# Patient Record
Sex: FEMALE | Race: WHITE | NOT HISPANIC OR LATINO | Employment: FULL TIME | URBAN - METROPOLITAN AREA
[De-identification: names, ages, dates, MRNs, and addresses within clinical notes are randomized per-mention and may not be internally consistent; named-entity substitution may affect disease eponyms.]

---

## 2019-05-21 ENCOUNTER — HISTORICAL (OUTPATIENT)
Dept: BARIATRICS | Facility: HOSPITAL | Age: 59
End: 2019-05-21

## 2019-06-05 ENCOUNTER — HISTORICAL (OUTPATIENT)
Dept: ANESTHESIOLOGY | Facility: HOSPITAL | Age: 59
End: 2019-06-05

## 2019-06-25 ENCOUNTER — HISTORICAL (OUTPATIENT)
Dept: BARIATRICS | Facility: HOSPITAL | Age: 59
End: 2019-06-25

## 2019-07-16 ENCOUNTER — HISTORICAL (OUTPATIENT)
Dept: BARIATRICS | Facility: HOSPITAL | Age: 59
End: 2019-07-16

## 2019-07-24 ENCOUNTER — HISTORICAL (OUTPATIENT)
Dept: SURGERY | Facility: HOSPITAL | Age: 59
End: 2019-07-24

## 2019-07-26 LAB — CRC RECOMMENDATION EXT: NORMAL

## 2019-08-05 ENCOUNTER — HISTORICAL (OUTPATIENT)
Dept: PREADMISSION TESTING | Facility: HOSPITAL | Age: 59
End: 2019-08-05

## 2019-08-13 ENCOUNTER — HISTORICAL (OUTPATIENT)
Dept: BARIATRICS | Facility: HOSPITAL | Age: 59
End: 2019-08-13

## 2019-09-23 ENCOUNTER — HISTORICAL (OUTPATIENT)
Dept: ADMINISTRATIVE | Facility: HOSPITAL | Age: 59
End: 2019-09-23

## 2019-09-24 ENCOUNTER — HISTORICAL (OUTPATIENT)
Dept: BARIATRICS | Facility: HOSPITAL | Age: 59
End: 2019-09-24

## 2019-12-02 ENCOUNTER — HISTORICAL (OUTPATIENT)
Dept: BARIATRICS | Facility: HOSPITAL | Age: 59
End: 2019-12-02

## 2020-01-07 ENCOUNTER — HISTORICAL (OUTPATIENT)
Dept: ADMINISTRATIVE | Facility: HOSPITAL | Age: 60
End: 2020-01-07

## 2020-02-04 ENCOUNTER — HISTORICAL (OUTPATIENT)
Dept: BARIATRICS | Facility: HOSPITAL | Age: 60
End: 2020-02-04

## 2020-04-28 ENCOUNTER — HISTORICAL (OUTPATIENT)
Dept: BARIATRICS | Facility: HOSPITAL | Age: 60
End: 2020-04-28

## 2020-06-04 ENCOUNTER — HISTORICAL (OUTPATIENT)
Dept: HEMATOLOGY/ONCOLOGY | Facility: CLINIC | Age: 60
End: 2020-06-04

## 2020-07-22 ENCOUNTER — HISTORICAL (OUTPATIENT)
Dept: BARIATRICS | Facility: HOSPITAL | Age: 60
End: 2020-07-22

## 2020-07-22 ENCOUNTER — HISTORICAL (OUTPATIENT)
Dept: ADMINISTRATIVE | Facility: HOSPITAL | Age: 60
End: 2020-07-22

## 2020-08-03 ENCOUNTER — HISTORICAL (OUTPATIENT)
Dept: BARIATRICS | Facility: HOSPITAL | Age: 60
End: 2020-08-03

## 2021-06-09 ENCOUNTER — HISTORICAL (OUTPATIENT)
Dept: ADMINISTRATIVE | Facility: HOSPITAL | Age: 61
End: 2021-06-09

## 2021-09-14 ENCOUNTER — HISTORICAL (OUTPATIENT)
Dept: ADMINISTRATIVE | Facility: HOSPITAL | Age: 61
End: 2021-09-14

## 2021-09-14 LAB
ABS NEUT (OLG): 3.3 X10(3)/MCL (ref 2.1–9.2)
ALBUMIN SERPL-MCNC: 4 GM/DL (ref 3.4–5)
ALBUMIN/GLOB SERPL: 1.67 {RATIO} (ref 1.5–2.5)
ALP SERPL-CCNC: 76 UNIT/L (ref 38–126)
ALT SERPL-CCNC: 17 UNIT/L (ref 7–52)
AST SERPL-CCNC: 15 UNIT/L (ref 15–37)
BILIRUB SERPL-MCNC: 0.4 MG/DL (ref 0.2–1)
BILIRUBIN DIRECT+TOT PNL SERPL-MCNC: 0.1 MG/DL (ref 0–0.5)
BILIRUBIN DIRECT+TOT PNL SERPL-MCNC: 0.3 MG/DL
BUN SERPL-MCNC: 11 MG/DL (ref 7–18)
CALCIUM SERPL-MCNC: 9.2 MG/DL (ref 8.5–10)
CHLORIDE SERPL-SCNC: 101 MMOL/L (ref 98–107)
CHOLEST SERPL-MCNC: 201 MG/DL (ref 0–200)
CHOLEST/HDLC SERPL: 5.4 {RATIO}
CO2 SERPL-SCNC: 30 MMOL/L (ref 21–32)
CREAT SERPL-MCNC: 0.71 MG/DL (ref 0.6–1.3)
CREAT UR-MCNC: 200 MG/DL
DEPRECATED CALCIDIOL+CALCIFEROL SERPL-MC: 34.5 NG/ML (ref 30–80)
ERYTHROCYTE [DISTWIDTH] IN BLOOD BY AUTOMATED COUNT: 12.7 % (ref 11.5–17)
EST. AVERAGE GLUCOSE BLD GHB EST-MCNC: 214 MG/DL
GLOBULIN SER-MCNC: 2.4 GM/DL (ref 1.2–3)
GLUCOSE SERPL-MCNC: 225 MG/DL (ref 74–106)
HBA1C MFR BLD: 9.1 % (ref 4.4–6.4)
HCT VFR BLD AUTO: 39.7 % (ref 37–47)
HDLC SERPL-MCNC: 37 MG/DL (ref 35–60)
HGB BLD-MCNC: 13.4 GM/DL (ref 12–16)
LDLC SERPL CALC-MCNC: 130 MG/DL (ref 0–129)
LYMPHOCYTES # BLD AUTO: 2.5 X10(3)/MCL (ref 0.6–3.4)
LYMPHOCYTES NFR BLD AUTO: 38.8 % (ref 13–40)
MCH RBC QN AUTO: 31 PG (ref 27–31.2)
MCHC RBC AUTO-ENTMCNC: 34 GM/DL (ref 32–36)
MCV RBC AUTO: 92 FL (ref 80–94)
MICROALBUMIN UR-MCNC: 150 MG/L
MICROALBUMIN/CREAT RATIO PNL UR: ABNORMAL MG/GM
MONOCYTES # BLD AUTO: 0.6 X10(3)/MCL (ref 0.1–1.3)
MONOCYTES NFR BLD AUTO: 9.9 % (ref 0.1–24)
NEUTROPHILS NFR BLD AUTO: 51.3 % (ref 47–80)
PLATELET # BLD AUTO: 210 X10(3)/MCL (ref 130–400)
PMV BLD AUTO: 10.5 FL (ref 9.4–12.4)
POTASSIUM SERPL-SCNC: 4 MMOL/L (ref 3.5–5.1)
PROT SERPL-MCNC: 6.4 GM/DL (ref 6.4–8.2)
RBC # BLD AUTO: 4.32 X10(6)/MCL (ref 4.2–5.4)
SODIUM SERPL-SCNC: 141 MMOL/L (ref 136–145)
TRIGL SERPL-MCNC: 225 MG/DL (ref 30–150)
VLDLC SERPL CALC-MCNC: 45 MG/DL
WBC # SPEC AUTO: 6.4 X10(3)/MCL (ref 4.5–11.5)

## 2022-04-07 ENCOUNTER — HISTORICAL (OUTPATIENT)
Dept: ADMINISTRATIVE | Facility: HOSPITAL | Age: 62
End: 2022-04-07

## 2022-04-24 VITALS
WEIGHT: 175.25 LBS | SYSTOLIC BLOOD PRESSURE: 140 MMHG | BODY MASS INDEX: 29.92 KG/M2 | HEIGHT: 64 IN | DIASTOLIC BLOOD PRESSURE: 73 MMHG

## 2022-04-28 NOTE — OP NOTE
Patient:   Rosario Regalado             MRN: [id number]            FIN: [id number]               Age:   59 years     Sex:  Female     :  1960   Associated Diagnoses:   HEARTBURN   Author:   Keon Vieira MD      Operative Note   Operative Information   Date/ Time:  2019 07:36:00.     Procedures Performed: Procedure Code   Esophagogastroduodenoscopy on 2019 at 59 Years.  Comments:  2019 7:36 CDT - Ana Rosa MOON, Felicia MEZA  auto-populated from documented surgical case, Esophagogastroduodenoscopy.     Preoperative Diagnosis: HEARTBURN ([id number] R12).     Postoperative Diagnosis: HEARTBURN ([id number] R12).     Surgeon: Keon Vieira MD.     Anesthesia: MAC.     Description of Procedure/Findings/    Complications: Patient was taken to the OR.  The patient's throat was aneshtetized.  MAC was utilized for anesthesia.   was easily passed.  Findings were as follows:  -Oropharynx: normal  -Airway: normal  -Esophagus: normal  -Stomach: normal    -Duodenum: normal.     Esimated blood loss: No blood loss.     Complications: None.

## 2022-05-01 NOTE — HISTORICAL [facility] CERNER
This is a historical note converted from [facility]. Formatting and pictures may have been removed.  Please reference [facility] for original formatting and attached multimedia. Chief Complaint  ear infection  History of Present Illness  Patient with swelling on left side of face for a few days, Had trouble finding a pharmacy to fill her Augmentin 875mg Rx, was in [place], and finally got it filled on 6-4-21  Pain in left cheek and gums.  Review of Systems  General :?Headache,?no dizziness,?no syncope  Eyes :?no eye redness, drainage, or pain; ?no visual complaints  HENT :?See HPI.  Respiratory :?Moderate?cough, Sharp pain in lungs when breathing.  Cardiovascular : no chest pain, palpitations,?or dyspnea on exertion  Gastrointestinal :?no nausea, vomiting, or diarrhea. No abdominal pain. No hematochezia, melena, or constipation?????????????????????????????????????????????????????????  Musculoskeletal :?No joint swelling , No joint pain. No myalgia???????????????????????????  Skin :?no rash or swollen lymph nodes  Physical Exam  Vitals & Measurements  HR:?78(Peripheral)? BP:?130/72?  HT:?163.00?cm? HT:?163?cm? WT:?82.600?kg? WT:?82.6?kg? BMI:?31.09?  General : Moderate wet cough, alert and oriented, stable on room air  Ears :?Left EAC with erythema and exudate, TM intact.? Right TM and EAC within normal limits.  Nose :?Erythema, clear discharge, sinus tenderness  OP :?Erythema, no exudate  Neck : Supple, no LAD  Lungs : Occasional wheeze, occasional rhonchi, no crackles. ?Good air movement.  CV : RRR, no murmur  ABD : Benign  Back : ?No CVAT  ?   XR chest: No infiltrate or effusion  Assessment/Plan  1.?Sinusitis?J32.9  Antibiotic changed to Omnicef 300 mg twice daily for 10 days.  2.?OE (otitis externa)?H60.90  Prescribed Floxin eardrops which she will use for 7 days.  3.?Cough?R05  ?Will send x-ray?to radiologist for interpretation.  4.?Bronchospasm?J98.01  ?Prescribed albuterol HFA?to use as needed  Referrals  Clinic  Follow-up PRN, 06/09/21 15:59:00 CDT, [facility], Future Order   Problem List/Past Medical History  Ongoing  Anemia  Anxiety  Arthritis of right hand  CAD - Coronary artery disease  DM2 (diabetes mellitus, type 2)  Hemorrhagic condition, unspecified  HTN (hypertension)  Hypercholesteremia  Neuropathy  Obesity  Palpitations  Peripheral venous insufficiency  Sinusitis  Sleep apnea  Stented coronary artery  Wellness examination  Historical  Acute UTI  Syncope  Procedure/Surgical History  Transfusion of Nonautologous Red Blood Cells into Peripheral Vein, Percutaneous Approach (07/31/2019)  Excision of Stomach, Percutaneous Endoscopic Approach, Vertical (07/29/2019)  Gastric Sleeve Laparoscopic (.) (07/29/2019)  Colonoscopy (07/26/2019)  Esophagogastroduodenoscopy (07/24/2019)  Esophagogastroduodenoscopy, flexible, transoral; diagnostic, including collection of specimen(s) by brushing or washing, when performed (separate procedure) (07/24/2019)  Inspection of Upper Intestinal Tract, Via Natural or Artificial Opening Endoscopic (07/24/2019)  Bilateral tubal ligation  Esophagogastroduodenoscopy  heart cath  Insertion of coronary artery stent  Partial hysterectomy   Medications  Albuterol (Eqv-ProAir HFA) 90 mcg/inh inhalation aerosol, 2 puff(s), INH, q4hr, PRN, 1 refills  Allegra 24 Hour Allergy oral tablet, 180 mg= 1 tab(s), Oral, Daily  Astelin 137 mcg/inh nasal spray, 2 spray(s), Nasal, BID, 11 refills,? ?Not taking  buPROPion 300 mg/24 hours (XL) oral tablet, extended release, See Instructions  carvedilol 3.125 mg oral tablet, See Instructions  Centrum Adults  clopidogrel 75 mg oral tablet, See Instructions  ezetimibe 10 mg oral tablet, See Instructions  fenofibrate 145 mg oral tablet, See Instructions  Floxin 0.3% otic solution, 5 drop(s), Otic, BID, 1 refills  hydrochlorothiazide-losartan 25 mg-100 mg oral tablet, See Instructions  montelukast 10 mg oral TABLET, See Instructions  Omnicef 300 mg oral capsule, 300  mg= 1 cap(s), Oral, q12hr  rosuvastatin 20 mg oral tablet, See Instructions  Vaniqa 13.9% topical cream, 1 vishal, TOP, BID, 11 refills,? ?Not taking  Vitamin B12 1000 mcg oral tablet, 1000 mcg= 1 tab(s), Oral, Daily  Allergies  Contrast Dye?(rash)  Cymbalta?(SUCIDIAL THOUGHTS)  Glucophage?(vomiting)  Morphine Sulfate?(trouble breathing, rash)  Ultram?(throat closing)  Social History  Abuse/Neglect  No, 06/09/2021  No, 10/28/2020  No, 03/27/2020  No, 02/18/2020  No, 01/20/2020  No, 09/26/2019  No, 08/15/2019  No, 07/29/2019  No, 07/24/2019  No, 07/15/2019  Alcohol  1-2 times per month, 07/08/2019  Employment/School  Employed, 03/27/2020  Home/Environment  Lives with Alone., 03/27/2020  Tobacco  Former smoker, quit more than 30 days ago, No, 06/09/2021  Former smoker, quit more than 30 days ago, No, 10/28/2020  Former smoker, quit more than 30 days ago, No, 03/27/2020  Former smoker, quit more than 30 days ago, N/A, 02/18/2020  Former smoker, quit more than 30 days ago, N/A, 09/26/2019  Former smoker, quit more than 30 days ago, N/A, 08/15/2019  Former smoker, quit more than 30 days ago, Cigarettes, N/A, 07/29/2019  Never (less than 100 in lifetime), N/A, 07/29/2019  Never (less than 100 in lifetime), N/A, 07/24/2019  Former smoker, quit more than 30 days ago, N/A, 07/15/2019  Family History  Cardiac disorder: Grandfather.  DM - Diabetes mellitus: Aunt, Grandmother and Uncle.  Heart attack: Grandfather and Grandmother.  Hypertension: Mother, Aunt and Grandmother.  Stroke: Grandmother.  Health Maintenance  Health Maintenance  ???Pending?(in the next year)  ??? ??OverDue  ??? ? ? ?Breast Cancer Screening due??03/23/19??and every 2??year(s)  ??? ? ? ?Coronary Artery Disease Maintenance-Electrocardiogram due??07/29/20??and every 1??year(s)  ??? ? ? ?Diabetes Maintenance-Medication Prescribed due??07/31/20??and every 1??year(s)  ??? ? ? ?Influenza Vaccine due??10/01/20??and every 1??day(s)  ??? ? ? ?Alcohol Misuse Screening  due??01/02/21??and every 1??year(s)  ??? ? ? ?Diabetes Maintenance-Fasting Lipid Profile due??01/20/21??and every 1??year(s)  ??? ? ? ?Diabetes Maintenance-Eye Exam due??01/24/21??and every 1??year(s)  ??? ? ? ?Depression Screening due??02/17/21??and every 1??year(s)  ??? ??Due?  ??? ? ? ?ADL Screening due??06/19/21??and every 1??year(s)  ??? ? ? ?Aspirin Therapy for CVD Prevention due??06/19/21??and every 1??year(s)  ??? ? ? ?Diabetes Maintenance-Foot Exam due??06/19/21??Unknown Frequency  ??? ? ? ?Hypertension Management-Education due??06/19/21??and every 1??year(s)  ??? ? ? ?Tetanus Vaccine due??06/19/21??and every 10??year(s)  ??? ? ? ?Zoster Vaccine due??06/19/21??Unknown Frequency  ??? ??Due In Future?  ??? ? ? ?Hypertension Management-BMP not due until??07/16/21??and every 1??year(s)  ??? ? ? ?Coronary Artery Disease Maintenance-BMP not due until??07/16/21??and every 1??year(s)  ??? ? ? ?Diabetes Maintenance-Serum Creatinine not due until??07/16/21??and every 1??year(s)  ??? ? ? ?Diabetes Maintenance-HgbA1c not due until??07/22/21??and every 1??year(s)  ??? ? ? ?Obesity Screening not due until??01/01/22??and every 1??year(s)  ??? ? ? ?Blood Pressure Screening not due until??06/09/22??and every 1??year(s)  ??? ? ? ?Body Mass Index Check not due until??06/09/22??and every 1??year(s)  ??? ? ? ?Hypertension Management-Blood Pressure not due until??06/09/22??and every 1??year(s)  ???Satisfied?(in the past 1 year)  ??? ??Satisfied?  ??? ? ? ?Blood Pressure Screening on??06/09/21.??Satisfied by Christopher Whitlock  ??? ? ? ?Body Mass Index Check on??06/09/21.??Satisfied by Christopher Whitlock  ??? ? ? ?Coronary Artery Disease Maintenance-Lipid Lowering Therapy on??06/03/21.??Satisfied by Rojelio Chamberlain ?[facility] NEGRETE  ??? ? ? ?Coronary Artery Disease Maintenance-Antiplatelet Agent Prescribed on??01/13/21.??Satisfied by Mary Guillen NP  ??? ? ? ?Coronary Artery Disease Maintenance-BMP on??07/16/20.??Satisfied by Alfonzo  Omi  ??? ? ? ?Diabetes Maintenance-HgbA1c on??07/22/20.??Satisfied by Daniela Johnson.  ??? ? ? ?Diabetes Maintenance-Serum Creatinine on??07/16/20.??Satisfied by Omi Mejía  ??? ? ? ?Diabetes Screening on??07/22/20.??Satisfied by Daniela Johnson.  ??? ? ? ?Hypertension Management-Blood Pressure on??06/09/21.??Satisfied by Christopher Whitlock  ??? ? ? ?Hypertension Management-BMP on??07/16/20.??Satisfied by Omi Mejía  ??? ? ? ?Influenza Vaccine on??10/28/20.??Satisfied by Sol Mahmood CMA  ??? ? ? ?Obesity Screening on??06/09/21.??Satisfied by Christopher Whitlock  ?

## 2022-06-16 PROBLEM — I10 HYPERTENSION: Status: ACTIVE | Noted: 2022-06-16

## 2022-06-16 PROBLEM — E11.9 TYPE 2 DIABETES MELLITUS: Status: ACTIVE | Noted: 2022-06-16

## 2022-06-16 PROBLEM — I25.10 ARTERIOSCLEROSIS OF CORONARY ARTERY: Status: ACTIVE | Noted: 2022-06-16

## 2022-07-19 DIAGNOSIS — R11.0 NAUSEA: Primary | ICD-10-CM

## 2022-07-19 DIAGNOSIS — R19.09 LEFT GROIN MASS: Primary | ICD-10-CM

## 2022-07-20 DIAGNOSIS — R19.09 MASS OF RIGHT INGUINAL REGION: Primary | ICD-10-CM

## 2022-07-21 PROCEDURE — 82607 VITAMIN B-12: CPT | Performed by: FAMILY MEDICINE

## 2022-07-21 PROCEDURE — 84425 ASSAY OF VITAMIN B-1: CPT | Performed by: FAMILY MEDICINE

## 2022-07-21 PROCEDURE — 83540 ASSAY OF IRON: CPT | Performed by: FAMILY MEDICINE

## 2022-07-27 ENCOUNTER — HOSPITAL ENCOUNTER (OUTPATIENT)
Dept: RADIOLOGY | Facility: HOSPITAL | Age: 62
Discharge: HOME | End: 2022-07-27
Attending: SURGERY
Payer: COMMERCIAL

## 2022-07-27 DIAGNOSIS — R11.0 NAUSEA: ICD-10-CM

## 2022-07-27 DIAGNOSIS — R19.09 MASS OF RIGHT INGUINAL REGION: ICD-10-CM

## 2022-07-27 PROCEDURE — 25500020 PHARM REV CODE 255: Performed by: SURGERY

## 2022-07-27 PROCEDURE — A9698 NON-RAD CONTRAST MATERIALNOC: HCPCS | Performed by: SURGERY

## 2022-07-27 PROCEDURE — 74220 X-RAY XM ESOPHAGUS 1CNTRST: CPT | Mod: TC

## 2022-07-27 PROCEDURE — 76882 US LMTD JT/FCL EVL NVASC XTR: CPT | Mod: TC,LT

## 2022-07-27 RX ADMIN — Medication 176 G: at 09:07

## 2022-07-27 RX ADMIN — BARIUM SULFATE 100 ML: 980 POWDER, FOR SUSPENSION ORAL at 09:07

## 2022-08-03 PROBLEM — R11.0 NAUSEA: Status: ACTIVE | Noted: 2022-08-03

## 2022-08-03 PROBLEM — R19.09 LEFT GROIN MASS: Status: ACTIVE | Noted: 2022-08-03

## 2022-09-22 PROBLEM — E78.00 HYPERCHOLESTEROLEMIA: Status: ACTIVE | Noted: 2022-09-22

## 2022-12-07 ENCOUNTER — DOCUMENTATION ONLY (OUTPATIENT)
Dept: PRIMARY CARE CLINIC | Facility: CLINIC | Age: 62
End: 2022-12-07
Payer: COMMERCIAL

## 2023-01-18 PROBLEM — E55.9 VITAMIN D DEFICIENCY: Status: ACTIVE | Noted: 2023-01-18

## 2023-02-16 DIAGNOSIS — N13.30 HYDRONEPHROSIS: Primary | ICD-10-CM

## 2023-02-23 ENCOUNTER — HOSPITAL ENCOUNTER (OUTPATIENT)
Dept: RADIOLOGY | Facility: HOSPITAL | Age: 63
Discharge: HOME | End: 2023-02-23
Attending: UROLOGY
Payer: COMMERCIAL

## 2023-02-23 DIAGNOSIS — N13.30 HYDRONEPHROSIS: ICD-10-CM

## 2023-02-23 LAB
CREAT SERPL-MCNC: 0.6 MG/DL (ref 0.5–1.4)
SAMPLE: NORMAL

## 2023-02-23 PROCEDURE — 74178 CT ABD&PLV WO CNTR FLWD CNTR: CPT | Mod: TC

## 2023-02-23 PROCEDURE — 25500020 PHARM REV CODE 255: Performed by: UROLOGY

## 2023-02-23 RX ADMIN — IOPAMIDOL 100 ML: 755 INJECTION, SOLUTION INTRAVENOUS at 09:02

## 2023-02-28 ENCOUNTER — LAB REQUISITION (OUTPATIENT)
Dept: LAB | Facility: HOSPITAL | Age: 63
End: 2023-02-28
Payer: COMMERCIAL

## 2023-02-28 DIAGNOSIS — D41.4 NEOPLASM OF UNCERTAIN BEHAVIOR OF BLADDER: ICD-10-CM

## 2023-02-28 DIAGNOSIS — R31.0 GROSS HEMATURIA: ICD-10-CM

## 2023-02-28 LAB
APTT PPP: 30.6 SECONDS (ref 23.2–33.7)
INR BLD: 0.93 (ref 0–1.3)
INR BLD: 0.94 (ref 0–1.3)
PROTHROMBIN TIME: 12.4 SECONDS (ref 12.5–14.5)
PROTHROMBIN TIME: 12.5 SECONDS (ref 12.5–14.5)

## 2023-02-28 PROCEDURE — 85610 PROTHROMBIN TIME: CPT

## 2023-02-28 PROCEDURE — 85730 THROMBOPLASTIN TIME PARTIAL: CPT | Performed by: UROLOGY

## 2023-02-28 PROCEDURE — 85610 PROTHROMBIN TIME: CPT | Performed by: UROLOGY

## 2023-02-28 PROCEDURE — 85730 THROMBOPLASTIN TIME PARTIAL: CPT

## 2023-03-02 ENCOUNTER — TELEPHONE (OUTPATIENT)
Dept: HEMATOLOGY/ONCOLOGY | Facility: CLINIC | Age: 63
End: 2023-03-02
Payer: COMMERCIAL

## 2023-03-02 DIAGNOSIS — D68.9 BLOOD CLOTTING DISORDER: Primary | ICD-10-CM

## 2023-03-02 NOTE — TELEPHONE ENCOUNTER
I reviewed patient chart and new notes sent to us by Dr. Moore with Dr. Pereira and advised patient we can see her on Monday 3/6/23 at 120 for visit with Dr. Pereira. Since she has not been seen in 3 years she will be considered a new pt.

## 2023-03-02 NOTE — TELEPHONE ENCOUNTER
Patient states that she has malignant tumor in bladder found on CT. Dr. Moore will be performing surgery, but needs clearance. His office will be faxing over. No date set as of yet. Patient states she normally has to get clearance for every procedure since gastric sleeve b/c she normally requires blood transfusions. Last seen February 2020.

## 2023-03-03 NOTE — PROGRESS NOTES
Subjective:       Patient ID: Rosario Regalado is a 62 y.o. female.    Chief Complaint:  I have bladder tumors    Diagnosis: History of bleeding post surgery                    + COVID-19 vaccinated    Current Treatment: Requesting surgical clearance    Clinical History:  Patient required transfusion with 2 units PRBCs in 2002 following a routine vaginal hysterectomy. She underwent a laparoscopic gastric sleeve procedure 7/19 and also had postoperative bleeding requiring transfusion again with 2 units PRBCs. She takes Plavix 75 mg daily due to history of CAD with a single stent. Her Plavix had been held for 10 days prior to her gastric sleeve. She does report a history of easy bruising. She had heavy menses prior to her hysterectomy. Her Cardiologist asked her if she had ever been tested for von Willebrand's disease.    She was seen for a Hematology evaluation 2/18/20. CBC from 1/7/20 showed a WBC count of 8.3, hemoglobin 13.4, hematocrit 42.0 and platelets 221. Serum iron was 79, TIBC 347, saturation 22.8% and B12 level 846. She believes a maternal aunt had a history of bleeding after surgery. There is no known family history of bleeding disorders. She had one son without any history of abnormal bruising or bleeding.      PT and PTT were normal.  Platelet function showed prolongation with collagen/ADP consistent with antiplatelet therapy (Plavix).  Von Willebrand's panel showed normal von Willebrand's factor antigen and activity with normal multimers.  Factor VIII level was above normal.  Recommendations were to repeat von Willebrand's panel in 3-4 months since levels can fluctuate over time.  Repeat von Willebrand panel 6/4/20 was normal showing no evidence of factor VIII or von Willebrand's factor deficiency.    Interval History  She returns to the office today requesting surgical clearance for cystoscopy and TURBT.  She was having intermittent episodes of hematuria for several months and was treated for multiple UTIs  and nephrolithiasis.  CT A/P 2/23/23 showed multiple masses in the bladder, largest on the right side measured 2.3 x 2.5 cm.  There was no abnormal lymphadenopathy.  Cystoscopy showed multiple tumors, no biopsies were performed.  PT and PTT drawn 2/28/23 were normal.  CBC 1/18/23 was normal with a platelet count of 238.  Due to her intermittent bleeding, she has been off of Plavix now for several months.  She denies any easy bruising, epistaxis or gum bleeding.  She is anxious about the planned surgery due to previous episodes of postoperative bleeding.    Review of Systems   Constitutional:  Negative for appetite change, fatigue, fever and unexpected weight change.   HENT:  Negative for mouth sores, sore throat and trouble swallowing.    Eyes: Negative.    Respiratory:  Negative for cough and shortness of breath.    Cardiovascular:  Negative for chest pain, palpitations and leg swelling.   Gastrointestinal:  Negative for abdominal distention, abdominal pain, blood in stool, constipation, diarrhea, nausea and vomiting.   Genitourinary:  Positive for hematuria (intermittent). Negative for dysuria, frequency and urgency.   Musculoskeletal:  Negative for arthralgias and back pain.   Integumentary:  Negative for rash and mole/lesion.   Neurological:  Negative for dizziness, weakness, numbness and headaches.   Hematological:  Negative for adenopathy. Does not bruise/bleed easily.   Psychiatric/Behavioral:  The patient is nervous/anxious.        PMHx:  HTN, hyperlipidemia, CAD with stent, type 2 DM, anemia, MEIR, obesity, anxiety, PVD  PSHx:  Coronary stent, hysterectomy, tubal ligation, gastric sleeve  SH:  Former smoker <1/4 PPD, quit 6/14. Occasional social alcohol use. Lives alone in [place]. 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  FH:  A maternal aunt had postsurgical bleeding. A maternal aunt had brain cancer, maternal uncle had throat cancer.     Objective:        BP (!) 166/82   Pulse 72   Temp 98 °F  "(36.7 °C)   Resp 18   Ht 5' 6" (1.676 m)   Wt 74.8 kg (165 lb)   SpO2 97%   BMI 26.63 kg/m²    Physical Exam  Constitutional:       Comments: Well-developed white female in NAD   HENT:      Head: Normocephalic.      Mouth/Throat:      Mouth: Mucous membranes are moist.      Pharynx: Oropharynx is clear. No posterior oropharyngeal erythema.   Eyes:      Extraocular Movements: Extraocular movements intact.      Conjunctiva/sclera: Conjunctivae normal.      Pupils: Pupils are equal, round, and reactive to light.   Cardiovascular:      Rate and Rhythm: Normal rate and regular rhythm.      Heart sounds: No murmur heard.  Pulmonary:      Comments: Lungs clear to auscultation  Abdominal:      General: Bowel sounds are normal. There is no distension.      Palpations: Abdomen is soft. There is no mass.      Tenderness: There is no abdominal tenderness.   Musculoskeletal:         General: No swelling or tenderness. Normal range of motion.      Cervical back: Neck supple. No tenderness.   Skin:     General: Skin is warm and dry.      Findings: No rash.   Neurological:      General: No focal deficit present.      Mental Status: She is oriented to person, place, and time.      Cranial Nerves: No cranial nerve deficit.      Motor: No weakness.        LABORATORY  Recent Results (from the past 168 hour(s))   Protime-INR    Collection Time: 02/28/23  2:04 PM   Result Value Ref Range    PT 12.4 (L) 12.5 - 14.5 seconds    INR 0.93 0.00 - 1.30   APTT    Collection Time: 02/28/23  2:04 PM   Result Value Ref Range    PTT 30.6 23.2 - 33.7 seconds   Protime-INR    Collection Time: 02/28/23  2:04 PM   Result Value Ref Range    PT 12.5 12.5 - 14.5 seconds    INR 0.94 0.00 - 1.30          Assessment:   History of postoperative bleeding requiring transfusion with negative hematology workup      Plan:   Her previous laboratory testing was reviewed and discussed.  She did not have any definable coagulopathy.  PT and PTT remain normal.  I " will recheck her platelet function testing off of Plavix.  If her testing is normal, will consider preoperative treatment with DDAVP to decrease her risk of bleeding.  Further treatment for her multifocal bladder tumors will depend on final pathology.  Will determine appropriate follow-up after final pathology available.      ELEONORA TRINIDAD MD    Other Physicians  Dr. Jose Del Castillo      ADDENDUM  3/30/23  Platelet function assay showed normal aggregation with ADP and epinephrine.  No platelet dysfunction documented.  Case discussed with Urology.  She was cleared for surgery.  Recommendations were for preoperative treatment with DDAVP 0.3 mcg/kg 30-60 minutes prior to surgery.

## 2023-03-06 ENCOUNTER — OFFICE VISIT (OUTPATIENT)
Dept: HEMATOLOGY/ONCOLOGY | Facility: CLINIC | Age: 63
End: 2023-03-06
Payer: COMMERCIAL

## 2023-03-06 VITALS
RESPIRATION RATE: 18 BRPM | OXYGEN SATURATION: 97 % | TEMPERATURE: 98 F | WEIGHT: 165 LBS | HEIGHT: 66 IN | BODY MASS INDEX: 26.52 KG/M2 | SYSTOLIC BLOOD PRESSURE: 166 MMHG | DIASTOLIC BLOOD PRESSURE: 82 MMHG | HEART RATE: 72 BPM

## 2023-03-06 DIAGNOSIS — Z87.898 HISTORY OF UNEXPLAINED BLEEDING: Primary | ICD-10-CM

## 2023-03-06 DIAGNOSIS — D68.9 BLOOD CLOTTING DISORDER: ICD-10-CM

## 2023-03-06 LAB
CLOSURE TME COLL+ADP BLD: 73 SECONDS (ref 46–119)
CLOSURE TME COLL+EPINEP BLD: 94 SECONDS (ref 68–183)

## 2023-03-06 PROCEDURE — 3008F BODY MASS INDEX DOCD: CPT | Mod: CPTII,S$GLB,, | Performed by: INTERNAL MEDICINE

## 2023-03-06 PROCEDURE — 85576 BLOOD PLATELET AGGREGATION: CPT | Performed by: INTERNAL MEDICINE

## 2023-03-06 PROCEDURE — 99214 OFFICE O/P EST MOD 30 MIN: CPT | Mod: S$GLB,,, | Performed by: INTERNAL MEDICINE

## 2023-03-06 PROCEDURE — 3060F POS MICROALBUMINURIA REV: CPT | Mod: CPTII,S$GLB,, | Performed by: INTERNAL MEDICINE

## 2023-03-06 PROCEDURE — 36415 COLL VENOUS BLD VENIPUNCTURE: CPT | Performed by: INTERNAL MEDICINE

## 2023-03-06 PROCEDURE — 3077F SYST BP >= 140 MM HG: CPT | Mod: CPTII,S$GLB,, | Performed by: INTERNAL MEDICINE

## 2023-03-06 PROCEDURE — 99214 PR OFFICE/OUTPT VISIT, EST, LEVL IV, 30-39 MIN: ICD-10-PCS | Mod: S$GLB,,, | Performed by: INTERNAL MEDICINE

## 2023-03-06 PROCEDURE — 3079F PR MOST RECENT DIASTOLIC BLOOD PRESSURE 80-89 MM HG: ICD-10-PCS | Mod: CPTII,S$GLB,, | Performed by: INTERNAL MEDICINE

## 2023-03-06 PROCEDURE — 1160F PR REVIEW ALL MEDS BY PRESCRIBER/CLIN PHARMACIST DOCUMENTED: ICD-10-PCS | Mod: CPTII,S$GLB,, | Performed by: INTERNAL MEDICINE

## 2023-03-06 PROCEDURE — 99999 PR PBB SHADOW E&M-EST. PATIENT-LVL IV: ICD-10-PCS | Mod: PBBFAC,,, | Performed by: INTERNAL MEDICINE

## 2023-03-06 PROCEDURE — 3079F DIAST BP 80-89 MM HG: CPT | Mod: CPTII,S$GLB,, | Performed by: INTERNAL MEDICINE

## 2023-03-06 PROCEDURE — 3066F PR DOCUMENTATION OF TREATMENT FOR NEPHROPATHY: ICD-10-PCS | Mod: CPTII,S$GLB,, | Performed by: INTERNAL MEDICINE

## 2023-03-06 PROCEDURE — 1159F MED LIST DOCD IN RCRD: CPT | Mod: CPTII,S$GLB,, | Performed by: INTERNAL MEDICINE

## 2023-03-06 PROCEDURE — 3066F NEPHROPATHY DOC TX: CPT | Mod: CPTII,S$GLB,, | Performed by: INTERNAL MEDICINE

## 2023-03-06 PROCEDURE — 1160F RVW MEDS BY RX/DR IN RCRD: CPT | Mod: CPTII,S$GLB,, | Performed by: INTERNAL MEDICINE

## 2023-03-06 PROCEDURE — 4010F PR ACE/ARB THEARPY RXD/TAKEN: ICD-10-PCS | Mod: CPTII,S$GLB,, | Performed by: INTERNAL MEDICINE

## 2023-03-06 PROCEDURE — 3008F PR BODY MASS INDEX (BMI) DOCUMENTED: ICD-10-PCS | Mod: CPTII,S$GLB,, | Performed by: INTERNAL MEDICINE

## 2023-03-06 PROCEDURE — 3060F PR POS MICROALBUMINURIA RESULT DOCUMENTED/REVIEW: ICD-10-PCS | Mod: CPTII,S$GLB,, | Performed by: INTERNAL MEDICINE

## 2023-03-06 PROCEDURE — 99999 PR PBB SHADOW E&M-EST. PATIENT-LVL IV: CPT | Mod: PBBFAC,,, | Performed by: INTERNAL MEDICINE

## 2023-03-06 PROCEDURE — 1159F PR MEDICATION LIST DOCUMENTED IN MEDICAL RECORD: ICD-10-PCS | Mod: CPTII,S$GLB,, | Performed by: INTERNAL MEDICINE

## 2023-03-06 PROCEDURE — 3077F PR MOST RECENT SYSTOLIC BLOOD PRESSURE >= 140 MM HG: ICD-10-PCS | Mod: CPTII,S$GLB,, | Performed by: INTERNAL MEDICINE

## 2023-03-06 PROCEDURE — 4010F ACE/ARB THERAPY RXD/TAKEN: CPT | Mod: CPTII,S$GLB,, | Performed by: INTERNAL MEDICINE

## 2023-03-21 ENCOUNTER — TELEPHONE (OUTPATIENT)
Dept: HEMATOLOGY/ONCOLOGY | Facility: CLINIC | Age: 63
End: 2023-03-21
Payer: COMMERCIAL

## 2023-03-21 NOTE — TELEPHONE ENCOUNTER
Jessica with Dr. Moore's office left message wanting to know if platelet function testing was ever done. ( I didn't see anything in system). They received clearance for surgery on 03/06/23, but are waiting on these results as well.

## 2023-03-22 PROBLEM — C67.9 BLADDER CANCER: Status: ACTIVE | Noted: 2023-03-22

## 2023-03-28 ENCOUNTER — TELEPHONE (OUTPATIENT)
Dept: HEMATOLOGY/ONCOLOGY | Facility: CLINIC | Age: 63
End: 2023-03-28
Payer: COMMERCIAL

## 2023-03-28 NOTE — TELEPHONE ENCOUNTER
Awaiting for Dr. Pereira to addend his note so I can send clearance and preop DDAVP dosing recommendations to Dr. Moore.

## 2023-03-28 NOTE — TELEPHONE ENCOUNTER
Jessica with Dr. Moore's office received platelet function test results and progress note. They are still needing an official clearance as well as instructions on DDAVP. Her fax is: [phone].

## 2023-05-26 PROCEDURE — 87088 URINE BACTERIA CULTURE: CPT | Performed by: FAMILY MEDICINE

## 2023-07-07 PROCEDURE — 82607 VITAMIN B-12: CPT | Performed by: FAMILY MEDICINE

## 2023-07-07 PROCEDURE — 83540 ASSAY OF IRON: CPT | Performed by: FAMILY MEDICINE

## 2023-07-07 PROCEDURE — 84425 ASSAY OF VITAMIN B-1: CPT | Performed by: FAMILY MEDICINE

## 2023-09-07 ENCOUNTER — PATIENT MESSAGE (OUTPATIENT)
Dept: RESEARCH | Facility: HOSPITAL | Age: 63
End: 2023-09-07
Payer: COMMERCIAL

## 2023-10-22 ENCOUNTER — HOSPITAL ENCOUNTER (INPATIENT)
Facility: HOSPITAL | Age: 63
LOS: 4 days | Discharge: HOME | DRG: 291 | End: 2023-10-26
Attending: EMERGENCY MEDICINE | Admitting: INTERNAL MEDICINE
Payer: COMMERCIAL

## 2023-10-22 DIAGNOSIS — R06.02 SOB (SHORTNESS OF BREATH): ICD-10-CM

## 2023-10-22 DIAGNOSIS — I50.32 CHRONIC DIASTOLIC HEART FAILURE: Primary | ICD-10-CM

## 2023-10-22 DIAGNOSIS — I50.9 CHF (CONGESTIVE HEART FAILURE): ICD-10-CM

## 2023-10-22 LAB
ALBUMIN SERPL-MCNC: 4.2 G/DL (ref 3.4–4.8)
ALBUMIN/GLOB SERPL: 1.4 RATIO (ref 1.1–2)
ALP SERPL-CCNC: 106 UNIT/L (ref 40–150)
ALT SERPL-CCNC: 38 UNIT/L (ref 0–55)
APPEARANCE UR: CLEAR
AST SERPL-CCNC: 41 UNIT/L (ref 5–34)
AV INDEX (PROSTH): 0.83
AV MEAN GRADIENT: 7 MMHG
AV PEAK GRADIENT: 12 MMHG
AV VELOCITY RATIO: 0.79
BACTERIA #/AREA URNS AUTO: NORMAL /HPF
BASOPHILS # BLD AUTO: 0.04 X10(3)/MCL
BASOPHILS NFR BLD AUTO: 0.3 %
BILIRUB SERPL-MCNC: 0.9 MG/DL
BILIRUB UR QL STRIP.AUTO: NEGATIVE
BNP BLD-MCNC: 469.5 PG/ML
BSA FOR ECHO PROCEDURE: 1.88 M2
BUN SERPL-MCNC: 11.8 MG/DL (ref 9.8–20.1)
CALCIUM SERPL-MCNC: 9.7 MG/DL (ref 8.4–10.2)
CHLORIDE SERPL-SCNC: 102 MMOL/L (ref 98–107)
CO2 SERPL-SCNC: 30 MMOL/L (ref 23–31)
COLOR UR AUTO: YELLOW
CREAT SERPL-MCNC: 0.71 MG/DL (ref 0.55–1.02)
CV ECHO LV RWT: 0.57 CM
DOP CALC AO PEAK VEL: 1.73 M/S
DOP CALC AO VTI: 36.4 CM
DOP CALC LVOT PEAK VEL: 1.36 M/S
DOP CALCLVOT PEAK VEL VTI: 30.3 CM
E WAVE DECELERATION TIME: 261 MSEC
E/A RATIO: 1.1
E/E' RATIO: 10.35 M/S
ECHO LV POSTERIOR WALL: 1.2 CM (ref 0.6–1.1)
EOSINOPHIL # BLD AUTO: 0.19 X10(3)/MCL (ref 0–0.9)
EOSINOPHIL NFR BLD AUTO: 1.4 %
ERYTHROCYTE [DISTWIDTH] IN BLOOD BY AUTOMATED COUNT: 12.6 % (ref 11.5–17)
EST. AVERAGE GLUCOSE BLD GHB EST-MCNC: 159.9 MG/DL
FRACTIONAL SHORTENING: 40 % (ref 28–44)
GFR SERPLBLD CREATININE-BSD FMLA CKD-EPI: >60 MLS/MIN/1.73/M2
GLOBULIN SER-MCNC: 3 GM/DL (ref 2.4–3.5)
GLUCOSE SERPL-MCNC: 184 MG/DL (ref 82–115)
GLUCOSE UR QL STRIP.AUTO: NEGATIVE
HBA1C MFR BLD: 7.2 %
HCT VFR BLD AUTO: 42.1 % (ref 37–47)
HGB BLD-MCNC: 14.4 G/DL (ref 12–16)
IMM GRANULOCYTES # BLD AUTO: 0.07 X10(3)/MCL (ref 0–0.04)
IMM GRANULOCYTES NFR BLD AUTO: 0.5 %
INTERVENTRICULAR SEPTUM: 1.2 CM (ref 0.6–1.1)
KETONES UR QL STRIP.AUTO: NEGATIVE
LEFT ATRIUM SIZE: 4.3 CM
LEFT ATRIUM VOLUME INDEX MOD: 44.7 ML/M2
LEFT ATRIUM VOLUME MOD: 83.1 CM3
LEFT INTERNAL DIMENSION IN SYSTOLE: 2.5 CM (ref 2.1–4)
LEFT VENTRICLE DIASTOLIC VOLUME INDEX: 42.26 ML/M2
LEFT VENTRICLE DIASTOLIC VOLUME: 78.6 ML
LEFT VENTRICLE MASS INDEX: 96 G/M2
LEFT VENTRICLE SYSTOLIC VOLUME INDEX: 12 ML/M2
LEFT VENTRICLE SYSTOLIC VOLUME: 22.3 ML
LEFT VENTRICULAR INTERNAL DIMENSION IN DIASTOLE: 4.2 CM (ref 3.5–6)
LEFT VENTRICULAR MASS: 178.16 G
LEUKOCYTE ESTERASE UR QL STRIP.AUTO: NEGATIVE
LV LATERAL E/E' RATIO: 9.78 M/S
LV SEPTAL E/E' RATIO: 11 M/S
LVOT MG: 4 MMHG
LVOT MV: 0.93 CM/S
LYMPHOCYTES # BLD AUTO: 1.97 X10(3)/MCL (ref 0.6–4.6)
LYMPHOCYTES NFR BLD AUTO: 14.2 %
MAGNESIUM SERPL-MCNC: 1.6 MG/DL (ref 1.6–2.6)
MCH RBC QN AUTO: 31.6 PG (ref 27–31)
MCHC RBC AUTO-ENTMCNC: 34.2 G/DL (ref 33–36)
MCV RBC AUTO: 92.5 FL (ref 80–94)
MONOCYTES # BLD AUTO: 1.28 X10(3)/MCL (ref 0.1–1.3)
MONOCYTES NFR BLD AUTO: 9.2 %
MV PEAK A VEL: 0.8 M/S
MV PEAK E VEL: 0.88 M/S
NEUTROPHILS # BLD AUTO: 10.36 X10(3)/MCL (ref 2.1–9.2)
NEUTROPHILS NFR BLD AUTO: 74.4 %
NITRITE UR QL STRIP.AUTO: NEGATIVE
NRBC BLD AUTO-RTO: 0 %
OHS LV EJECTION FRACTION SIMPSONS BIPLANE MOD: 63 %
PH UR STRIP.AUTO: 7.5 [PH]
PLATELET # BLD AUTO: 168 X10(3)/MCL (ref 130–400)
PMV BLD AUTO: 12 FL (ref 7.4–10.4)
POCT GLUCOSE: 186 MG/DL (ref 70–110)
POCT GLUCOSE: 287 MG/DL (ref 70–110)
POTASSIUM SERPL-SCNC: 4.2 MMOL/L (ref 3.5–5.1)
PROT SERPL-MCNC: 7.2 GM/DL (ref 5.8–7.6)
PROT UR QL STRIP.AUTO: ABNORMAL
PV PEAK GRADIENT: 4 MMHG
PV PEAK VELOCITY: 1.01 M/S
RBC # BLD AUTO: 4.55 X10(6)/MCL (ref 4.2–5.4)
RBC #/AREA URNS AUTO: <5 /HPF
RBC UR QL AUTO: ABNORMAL
SODIUM SERPL-SCNC: 142 MMOL/L (ref 136–145)
SP GR UR STRIP.AUTO: 1.01 (ref 1–1.03)
SQUAMOUS #/AREA URNS AUTO: <5 /HPF
TDI LATERAL: 0.09 M/S
TDI SEPTAL: 0.08 M/S
TDI: 0.09 M/S
TRICUSPID ANNULAR PLANE SYSTOLIC EXCURSION: 2.38 CM
TROPONIN I SERPL-MCNC: 0.02 NG/ML (ref 0–0.04)
UROBILINOGEN UR STRIP-ACNC: 0.2
WBC # SPEC AUTO: 13.91 X10(3)/MCL (ref 4.5–11.5)
WBC #/AREA URNS AUTO: <5 /HPF
Z-SCORE OF LEFT VENTRICULAR DIMENSION IN END DIASTOLE: -2.09
Z-SCORE OF LEFT VENTRICULAR DIMENSION IN END SYSTOLE: -1.93

## 2023-10-22 PROCEDURE — 99900031 HC PATIENT EDUCATION (STAT)

## 2023-10-22 PROCEDURE — 81001 URINALYSIS AUTO W/SCOPE: CPT | Performed by: STUDENT IN AN ORGANIZED HEALTH CARE EDUCATION/TRAINING PROGRAM

## 2023-10-22 PROCEDURE — 63600175 PHARM REV CODE 636 W HCPCS: Performed by: EMERGENCY MEDICINE

## 2023-10-22 PROCEDURE — 93005 ELECTROCARDIOGRAM TRACING: CPT

## 2023-10-22 PROCEDURE — 94761 N-INVAS EAR/PLS OXIMETRY MLT: CPT

## 2023-10-22 PROCEDURE — 83880 ASSAY OF NATRIURETIC PEPTIDE: CPT | Performed by: EMERGENCY MEDICINE

## 2023-10-22 PROCEDURE — 63600175 PHARM REV CODE 636 W HCPCS: Performed by: INTERNAL MEDICINE

## 2023-10-22 PROCEDURE — 83735 ASSAY OF MAGNESIUM: CPT | Performed by: EMERGENCY MEDICINE

## 2023-10-22 PROCEDURE — 96374 THER/PROPH/DIAG INJ IV PUSH: CPT

## 2023-10-22 PROCEDURE — 85025 COMPLETE CBC W/AUTO DIFF WBC: CPT | Performed by: STUDENT IN AN ORGANIZED HEALTH CARE EDUCATION/TRAINING PROGRAM

## 2023-10-22 PROCEDURE — 96375 TX/PRO/DX INJ NEW DRUG ADDON: CPT

## 2023-10-22 PROCEDURE — 84484 ASSAY OF TROPONIN QUANT: CPT | Performed by: STUDENT IN AN ORGANIZED HEALTH CARE EDUCATION/TRAINING PROGRAM

## 2023-10-22 PROCEDURE — 93010 EKG 12-LEAD: ICD-10-PCS | Mod: ,,, | Performed by: INTERNAL MEDICINE

## 2023-10-22 PROCEDURE — 82962 GLUCOSE BLOOD TEST: CPT

## 2023-10-22 PROCEDURE — 96376 TX/PRO/DX INJ SAME DRUG ADON: CPT

## 2023-10-22 PROCEDURE — 27000221 HC OXYGEN, UP TO 24 HOURS

## 2023-10-22 PROCEDURE — 80053 COMPREHEN METABOLIC PANEL: CPT | Performed by: EMERGENCY MEDICINE

## 2023-10-22 PROCEDURE — 25000003 PHARM REV CODE 250: Performed by: INTERNAL MEDICINE

## 2023-10-22 PROCEDURE — 25000242 PHARM REV CODE 250 ALT 637 W/ HCPCS: Performed by: EMERGENCY MEDICINE

## 2023-10-22 PROCEDURE — 25500020 PHARM REV CODE 255: Performed by: EMERGENCY MEDICINE

## 2023-10-22 PROCEDURE — 94640 AIRWAY INHALATION TREATMENT: CPT

## 2023-10-22 PROCEDURE — 11000001 HC ACUTE MED/SURG PRIVATE ROOM

## 2023-10-22 PROCEDURE — 25000003 PHARM REV CODE 250: Performed by: NURSE PRACTITIONER

## 2023-10-22 PROCEDURE — 83036 HEMOGLOBIN GLYCOSYLATED A1C: CPT | Performed by: PHYSICIAN ASSISTANT

## 2023-10-22 PROCEDURE — 63600175 PHARM REV CODE 636 W HCPCS: Performed by: PHYSICIAN ASSISTANT

## 2023-10-22 PROCEDURE — 93010 ELECTROCARDIOGRAM REPORT: CPT | Mod: ,,, | Performed by: INTERNAL MEDICINE

## 2023-10-22 PROCEDURE — 99291 CRITICAL CARE FIRST HOUR: CPT

## 2023-10-22 RX ORDER — IBUPROFEN 200 MG
24 TABLET ORAL
Status: DISCONTINUED | OUTPATIENT
Start: 2023-10-22 | End: 2023-10-26 | Stop reason: HOSPADM

## 2023-10-22 RX ORDER — INSULIN ASPART 100 [IU]/ML
0-10 INJECTION, SOLUTION INTRAVENOUS; SUBCUTANEOUS
Status: DISCONTINUED | OUTPATIENT
Start: 2023-10-22 | End: 2023-10-26 | Stop reason: HOSPADM

## 2023-10-22 RX ORDER — PANTOPRAZOLE SODIUM 40 MG/1
40 TABLET, DELAYED RELEASE ORAL 2 TIMES DAILY
Status: DISCONTINUED | OUTPATIENT
Start: 2023-10-22 | End: 2023-10-26 | Stop reason: HOSPADM

## 2023-10-22 RX ORDER — ONDANSETRON 4 MG/1
4 TABLET, ORALLY DISINTEGRATING ORAL
Status: DISCONTINUED | OUTPATIENT
Start: 2023-10-22 | End: 2023-10-22

## 2023-10-22 RX ORDER — FUROSEMIDE 10 MG/ML
20 INJECTION INTRAMUSCULAR; INTRAVENOUS DAILY
Status: DISCONTINUED | OUTPATIENT
Start: 2023-10-23 | End: 2023-10-22

## 2023-10-22 RX ORDER — DIPHENHYDRAMINE HYDROCHLORIDE 50 MG/ML
25 INJECTION INTRAMUSCULAR; INTRAVENOUS
Status: COMPLETED | OUTPATIENT
Start: 2023-10-22 | End: 2023-10-22

## 2023-10-22 RX ORDER — METHYLPREDNISOLONE SOD SUCC 125 MG
125 VIAL (EA) INJECTION
Status: COMPLETED | OUTPATIENT
Start: 2023-10-22 | End: 2023-10-22

## 2023-10-22 RX ORDER — ATORVASTATIN CALCIUM 40 MG/1
40 TABLET, FILM COATED ORAL DAILY
Status: DISCONTINUED | OUTPATIENT
Start: 2023-10-22 | End: 2023-10-26 | Stop reason: HOSPADM

## 2023-10-22 RX ORDER — ASPIRIN 81 MG/1
81 TABLET ORAL DAILY
Status: DISCONTINUED | OUTPATIENT
Start: 2023-10-22 | End: 2023-10-26 | Stop reason: HOSPADM

## 2023-10-22 RX ORDER — CLONIDINE HYDROCHLORIDE 0.1 MG/1
0.1 TABLET ORAL 3 TIMES DAILY PRN
Status: DISCONTINUED | OUTPATIENT
Start: 2023-10-22 | End: 2023-10-26 | Stop reason: HOSPADM

## 2023-10-22 RX ORDER — CARVEDILOL 3.12 MG/1
6.25 TABLET ORAL 2 TIMES DAILY WITH MEALS
Status: DISCONTINUED | OUTPATIENT
Start: 2023-10-23 | End: 2023-10-25

## 2023-10-22 RX ORDER — ONDANSETRON 2 MG/ML
4 INJECTION INTRAMUSCULAR; INTRAVENOUS
Status: COMPLETED | OUTPATIENT
Start: 2023-10-22 | End: 2023-10-22

## 2023-10-22 RX ORDER — FUROSEMIDE 10 MG/ML
20 INJECTION INTRAMUSCULAR; INTRAVENOUS
Status: COMPLETED | OUTPATIENT
Start: 2023-10-22 | End: 2023-10-22

## 2023-10-22 RX ORDER — ACETAMINOPHEN 325 MG/1
650 TABLET ORAL EVERY 8 HOURS PRN
Status: DISCONTINUED | OUTPATIENT
Start: 2023-10-22 | End: 2023-10-26 | Stop reason: HOSPADM

## 2023-10-22 RX ORDER — VALSARTAN 80 MG/1
80 TABLET ORAL DAILY
Status: DISCONTINUED | OUTPATIENT
Start: 2023-10-22 | End: 2023-10-23

## 2023-10-22 RX ORDER — ENOXAPARIN SODIUM 100 MG/ML
40 INJECTION SUBCUTANEOUS EVERY 24 HOURS
Status: DISCONTINUED | OUTPATIENT
Start: 2023-10-22 | End: 2023-10-26 | Stop reason: HOSPADM

## 2023-10-22 RX ORDER — CLOPIDOGREL BISULFATE 75 MG/1
75 TABLET ORAL DAILY
Status: DISCONTINUED | OUTPATIENT
Start: 2023-10-22 | End: 2023-10-22

## 2023-10-22 RX ORDER — CARVEDILOL 3.12 MG/1
3.12 TABLET ORAL 2 TIMES DAILY WITH MEALS
Status: DISCONTINUED | OUTPATIENT
Start: 2023-10-22 | End: 2023-10-22

## 2023-10-22 RX ORDER — IBUPROFEN 200 MG
16 TABLET ORAL
Status: DISCONTINUED | OUTPATIENT
Start: 2023-10-22 | End: 2023-10-26 | Stop reason: HOSPADM

## 2023-10-22 RX ORDER — IPRATROPIUM BROMIDE AND ALBUTEROL SULFATE 2.5; .5 MG/3ML; MG/3ML
3 SOLUTION RESPIRATORY (INHALATION)
Status: COMPLETED | OUTPATIENT
Start: 2023-10-22 | End: 2023-10-22

## 2023-10-22 RX ORDER — TAMSULOSIN HYDROCHLORIDE 0.4 MG/1
0.4 CAPSULE ORAL DAILY
Status: DISCONTINUED | OUTPATIENT
Start: 2023-10-22 | End: 2023-10-26 | Stop reason: HOSPADM

## 2023-10-22 RX ORDER — HYDROMORPHONE HYDROCHLORIDE 2 MG/ML
1 INJECTION, SOLUTION INTRAMUSCULAR; INTRAVENOUS; SUBCUTANEOUS
Status: COMPLETED | OUTPATIENT
Start: 2023-10-22 | End: 2023-10-22

## 2023-10-22 RX ORDER — ONDANSETRON 2 MG/ML
4 INJECTION INTRAMUSCULAR; INTRAVENOUS EVERY 8 HOURS PRN
Status: DISCONTINUED | OUTPATIENT
Start: 2023-10-22 | End: 2023-10-26 | Stop reason: HOSPADM

## 2023-10-22 RX ORDER — HYOSCYAMINE SULFATE 0.12 MG/1
0.12 TABLET SUBLINGUAL
Status: DISCONTINUED | OUTPATIENT
Start: 2023-10-22 | End: 2023-10-22

## 2023-10-22 RX ORDER — DIAZEPAM 5 MG/1
5 TABLET ORAL EVERY 12 HOURS PRN
Status: DISCONTINUED | OUTPATIENT
Start: 2023-10-22 | End: 2023-10-26 | Stop reason: HOSPADM

## 2023-10-22 RX ORDER — FUROSEMIDE 10 MG/ML
20 INJECTION INTRAMUSCULAR; INTRAVENOUS
Status: DISCONTINUED | OUTPATIENT
Start: 2023-10-22 | End: 2023-10-23

## 2023-10-22 RX ORDER — ACETAMINOPHEN 325 MG/1
650 TABLET ORAL EVERY 4 HOURS PRN
Status: DISCONTINUED | OUTPATIENT
Start: 2023-10-22 | End: 2023-10-26 | Stop reason: HOSPADM

## 2023-10-22 RX ORDER — GLUCAGON 1 MG
1 KIT INJECTION
Status: DISCONTINUED | OUTPATIENT
Start: 2023-10-22 | End: 2023-10-26 | Stop reason: HOSPADM

## 2023-10-22 RX ORDER — CLONIDINE HYDROCHLORIDE 0.1 MG/1
0.1 TABLET ORAL
Status: DISCONTINUED | OUTPATIENT
Start: 2023-10-22 | End: 2023-10-22

## 2023-10-22 RX ADMIN — METHYLPREDNISOLONE SODIUM SUCCINATE 125 MG: 125 INJECTION, POWDER, FOR SOLUTION INTRAMUSCULAR; INTRAVENOUS at 07:10

## 2023-10-22 RX ADMIN — CARVEDILOL 3.12 MG: 3.12 TABLET, FILM COATED ORAL at 07:10

## 2023-10-22 RX ADMIN — DIPHENHYDRAMINE HYDROCHLORIDE 25 MG: 50 INJECTION INTRAMUSCULAR; INTRAVENOUS at 07:10

## 2023-10-22 RX ADMIN — ONDANSETRON 4 MG: 2 INJECTION INTRAMUSCULAR; INTRAVENOUS at 06:10

## 2023-10-22 RX ADMIN — IOPAMIDOL 100 ML: 755 INJECTION, SOLUTION INTRAVENOUS at 07:10

## 2023-10-22 RX ADMIN — IPRATROPIUM BROMIDE AND ALBUTEROL SULFATE 3 ML: .5; 3 SOLUTION RESPIRATORY (INHALATION) at 06:10

## 2023-10-22 RX ADMIN — ENOXAPARIN SODIUM 40 MG: 40 INJECTION SUBCUTANEOUS at 05:10

## 2023-10-22 RX ADMIN — FUROSEMIDE 20 MG: 10 INJECTION, SOLUTION INTRAMUSCULAR; INTRAVENOUS at 05:10

## 2023-10-22 RX ADMIN — VALSARTAN 80 MG: 80 TABLET, FILM COATED ORAL at 11:10

## 2023-10-22 RX ADMIN — HYDROMORPHONE HYDROCHLORIDE 1 MG: 2 INJECTION INTRAMUSCULAR; INTRAVENOUS; SUBCUTANEOUS at 06:10

## 2023-10-22 RX ADMIN — FUROSEMIDE 20 MG: 10 INJECTION, SOLUTION INTRAMUSCULAR; INTRAVENOUS at 09:10

## 2023-10-22 RX ADMIN — PANTOPRAZOLE SODIUM 40 MG: 40 TABLET, DELAYED RELEASE ORAL at 08:10

## 2023-10-22 RX ADMIN — TAMSULOSIN HYDROCHLORIDE 0.4 MG: 0.4 CAPSULE ORAL at 11:10

## 2023-10-22 RX ADMIN — ASPIRIN 81 MG: 81 TABLET, COATED ORAL at 11:10

## 2023-10-22 RX ADMIN — PANTOPRAZOLE SODIUM 40 MG: 40 TABLET, DELAYED RELEASE ORAL at 11:10

## 2023-10-22 RX ADMIN — CLONIDINE HYDROCHLORIDE 0.1 MG: 0.1 TABLET ORAL at 10:10

## 2023-10-22 RX ADMIN — INSULIN ASPART 4 UNITS: 100 INJECTION, SOLUTION INTRAVENOUS; SUBCUTANEOUS at 04:10

## 2023-10-22 RX ADMIN — CARVEDILOL 3.12 MG: 3.12 TABLET, FILM COATED ORAL at 11:10

## 2023-10-22 RX ADMIN — ATORVASTATIN CALCIUM 40 MG: 40 TABLET, FILM COATED ORAL at 11:10

## 2023-10-22 RX ADMIN — ONDANSETRON 4 MG: 2 INJECTION INTRAMUSCULAR; INTRAVENOUS at 07:10

## 2023-10-22 NOTE — H&P
[facility] Hospital Medicine History & Physical Examination       Patient Name: Rosario Regalado  MRN: [id number]  Patient Class: IP- Inpatient   Admission Date: 10/22/2023   Admitting Physician: Misti Spicer MD  Length of Stay: 0  Attending Physician: Misti Spicer MD  Primary Care Provider: MELLY Serrato Jr., MD  Face-to-Face encounter date: 10/22/2023  Code Status:  Chief Complaint: Abdominal Pain and Vomiting (Hx of bladder cancer had cystoscopy w/ fulguration on Thursday at [facility], was d/c'd yesterday and took out her perez catheter yesterday as well, pt began having bladder spasms and n/v this AM, denies hematuria, retention, or fever. )        Patient information was obtained from patient, patient's family, past medical records and ER records.     HISTORY OF PRESENT ILLNESS:   Rosario Regalado is a 63 y.o. female with a past medical history of essential hypertension, HLD, CAD, bladder cancer s/p tumor s/p removal and cystocopy at [facility] on 10/19/2023 presented to [facility] on 10/22/2023 for shortness of breath.  Patient reported shortness of breath began last night.  Patient states she had recent bladder tumor removal and cystoscopy at [facility] on 10/19/2023.  Patient reports she was discharged yesterday.  Patient states when she got home shortness of breath, nausea, vomiting, and bladder spasms began, prompting her to ED. Patient also reported chest pressure secondary to difficulty breathing and dry cough for the past week. Patient denied fever, dysuria, and hematuria.  Patient reports she is followed by Cardiologist Dr. Nelson.   Initial vital signs in ED were /88, pulse 90, respirations 19, temperature 36.6° C, and SpO2 95% on room air.  Labs revealed WBC 13.91, glucose 184, AST 41, .5, and troponin 0.016.  EKG revealed normal sinus rhythm, heart rate of 80 beats per minute, and incomplete right bundle-branch block.  Chest x-ray revealed enlarged cardiac  silhouette with interstitial edema.  CTA chest revealed no evidence of pulmonary embolus with mild cardiomegaly and pleural effusions suggestive of interstitial and early alveolar edema.  CT abdomen pelvis with contrast revealed no significant intra-abdominal abnormality with trace free intraperitoneal fluid.  Patient was given IV methylprednisolone 125 mg, IV Benadryl 25 mg, IV Zofran 4 mg, IV hydromorphone 1 mg, IV Lasix 20 mg, and clonidine 0.1 mg tablet in ED. Patient was admitted to hospital medicine service for further medical management.     PAST MEDICAL HISTORY:   HTN  HLD  CAD  Bladder cancer s/p tumor removal and cystocopy    PAST SURGICAL HISTORY:   Coronary stent placement   Esophagogastroduodenoscopy  Gastric sleeve   Hysterectomy-partial  Tubal ligation  Cystoscopy with bladder tumor removal    ALLERGIES:   Gadolinium-containing contrast media, Morphine, Tramadol, Duloxetine, Metformin, Morphine sulfate, Morpholine analogues, Clindamycin hcl, and Iodinated contrast media    FAMILY HISTORY:   Reviewed and negative    SOCIAL HISTORY:   Denies tobacco, drug, and alcohol use    HOME MEDICATIONS:     Prior to Admission medications    Medication Sig Start Date End Date Taking? Authorizing Provider   albuterol (PROVENTIL/VENTOLIN HFA) 90 mcg/actuation inhaler Inhale 2 puffs into the lungs every 4 (four) hours as needed for Wheezing.  Patient not taking: Reported on 2023   MELLY Serrato Jr., MD   aspirin (ECOTRIN) 81 MG EC tablet Take 81 mg by mouth. 23   Provider, Historical   azelastine (OPTIVAR) 0.05 % ophthalmic solution Place 1 drop into both eyes 2 (two) times daily. 23   MELLY Serrato Jr., MD   BANOPHEN 25 mg capsule SMARTSI Capsule(s) By Mouth 23   Provider, Historical   carvediloL (COREG) 3.125 MG tablet Take 1 tablet (3.125 mg total) by mouth 2 (two) times daily with meals. 23  MELLY Serrato Jr., MD   carvediloL (COREG) 3.125 MG tablet  TAKE 1 TABLET BY MOUTH TWICE A DAY 7/20/23   MELLY Serrato Jr., MD   clotrimazole-betamethasone 1-0.05% (LOTRISONE) cream Apply topically 2 (two) times daily. 6/27/23   MELLY Serrato Jr., MD   diazePAM (VALIUM) 5 MG tablet Take 1 to 2 Tab(s) Oral up to every 8 hours as needed for Acute Anxiety 5/26/23   MELLY Serrato Jr., MD   hyoscyamine (LEVSIN) 0.125 mg TbDL Take 0.125 mg by mouth. 4/12/23   Provider, Historical   hyoscyamine (LEVSIN/SL) 0.125 mg Subl PLACE 1 TABLET UNDER THE TONGUE EVERY 4 HOURS AS NEEDED FOR BLADDER SPASMS 8/16/23   MELLY Serrato Jr., MD   ketorolac (TORADOL) 10 mg tablet TAKE 1 TABLET BY MOUTH TWO TIMES DAILY AS NEEDED FOR 5 DAYS 2/3/23   Provider, Historical   ofloxacin (FLOXIN) 0.3 % otic solution Place 5 drops into both ears once daily. 8/29/23   MELLY Serrato Jr., MD   pantoprazole (PROTONIX) 40 MG tablet TAKE 1 TABLET BY MOUTH TWICE A DAY 10/17/23   MELLY Serrato Jr., MD   PLAVIX 75 mg tablet Take 75 mg by mouth. 4/24/23   Provider, Historical   promethazine-dextromethorphan (PROMETHAZINE-DM) 6.25-15 mg/5 mL Syrp Take 5 mLs by mouth every 6 (six) hours as needed. 7/7/23   MELLY Serrato Jr., MD   rosuvastatin (CRESTOR) 40 MG Tab Take 1 tablet (40 mg total) by mouth once daily. 3/22/23   MELLY Serrato Jr., MD   RYBELSUS 3 mg tablet TAKE 1 TABLET BY MOUTH 30 MINS BEFORE FIRST FOOD, BEVERAGE OR OTHER MEALS Strength: 3 mg 2/23/23   MELLY Serrato Jr., MD   tamsulosin (FLOMAX) 0.4 mg Cap TAKE 1 CAPSULE BY MOUTH EVERY 12 HOURS AS NEEDED FOR RENAL COLIC 3/21/23   MELLY Serrato Jr., MD   valsartan (DIOVAN) 80 MG tablet Take 1 tablet (80 mg total) by mouth once daily. 3/22/23   MELLY Serrato Jr., MD       REVIEW OF SYSTEMS:   Except as documented, all other systems reviewed and negative     PHYSICAL EXAM:     VITAL SIGNS: 24 HRS MIN & MAX LAST   Temp  Min: 97.9 °F (36.6 °C)  Max: 97.9 °F (36.6 °C) 97.9 °F (36.6 °C)   BP  Min: 156/60   Max: 210/83 (!) 156/60   Pulse  Min: 64  Max: 90  66   Resp  Min: 17  Max: 19 18   SpO2  Min: 95 %  Max: 97 % 96 %       General appearance: Female in no apparent distress.  HEENNT: Atraumatic head.   Lungs: Clear to auscultation bilaterally.  Heart: Regular rate and rhythm.    Abdomen: Soft, non-distended, non-tender. Bowel sounds are normal.   Extremities: No cyanosis, clubbing, or edema. No deformities.   Skin: No Rash. Warm and dry.   Neuro: Awake, alert, and oriented. Motor and sensory exams grossly intact.   Psych/mental status: Appropriate mood and affect. Responds appropriately to questions.     LABS AND IMAGING:     Recent Labs   Lab 10/22/23  0432   WBC 13.91*   RBC 4.55   HGB 14.4   HCT 42.1   MCV 92.5   MCH 31.6*   MCHC 34.2   RDW 12.6      MPV 12.0*       Recent Labs   Lab 10/22/23  0432      K 4.2   CO2 30   BUN 11.8   CREATININE 0.71   CALCIUM 9.7   MG 1.60   ALBUMIN 4.2   ALKPHOS 106   ALT 38   AST 41*   BILITOT 0.9       Microbiology Results (last 7 days)       ** No results found for the last 168 hours. **             X-Ray Chest AP Portable  Narrative: EXAMINATION:  XR CHEST AP PORTABLE    CLINICAL HISTORY:  shortness of breath;    TECHNIQUE:  Single frontal view of the chest was performed.    COMPARISON:  07/07/2023    FINDINGS:  LINES AND TUBES: None    MEDIASTINUM AND CAMILO: Cardiac silhouette is enlarged.    LUNGS: Basilar and perihilar interstitial opacities with Kerley B lines.    PLEURA:Trace left pleural fluid.No pneumothorax.    BONES: No acute osseous abnormality.  Impression: Enlarged cardiac silhouette with interstitial edema    Electronically signed by: Luz Elena Clifford  Date:    10/22/2023  Time:    08:40  CT Abdomen Pelvis With Contrast  Narrative: EXAMINATION:  CT ABDOMEN PELVIS WITH CONTRAST    CLINICAL HISTORY:  Abdominal pain, post-op;post bladder sx ab pain;    TECHNIQUE:  Helically acquired images with axial, sagittal and coronal reformations were obtained from the  lung bases to the pubic symphysis after the IV administration of contrast.    Automated tube current modulation, weight-based exposure dosing, and/or iterative reconstruction technique utilized to reach lowest reasonably achievable exposure rate.    DLP: 1022 mGy*cm    COMPARISON:  CT abdomen pelvis 02/23/2023    FINDINGS:  LUNG BASES: See separate report for CT of the chest.    LIVER: Slightly mottled attenuation of the liver may be related to passive hepatic congestion    BILIARY: No calcified gallstones.    PANCREAS: No inflammatory change.    SPLEEN: Calcified granulomata.    ADRENALS: No mass.    KIDNEYS/URETERS: The kidneys enhance symmetrically.  No hydronephrosis.    GI TRACT/MESENTERY: Postop sleeve gastrectomy.  No evidence of bowel obstruction or inflammation. The appendix is normal.    PERITONEUM: Trace free fluid.No free air.    LYMPH NODES: No enlarged lymph nodes by size criteria.    VASCULATURE: Aortoiliac atherosclerosis.    BLADDER: Previously seen bladder mass is not readily appreciable.  Defer to cystoscopy.    REPRODUCTIVE ORGANS: Uterus is surgically absent.    SOFT TISSUES: Unremarkable.    BONES: Degenerative changes at the hips.  Impression: No significant intra-abdominal abnormality    Trace free intraperitoneal fluid    Electronically signed by: Luz Elena Clifford  Date:    10/22/2023  Time:    08:39  CTA Chest Non-Coronary (PE Studies)  Narrative: EXAMINATION:  CTA CHEST NON CORONARY (PE STUDIES)    CLINICAL HISTORY:  Pulmonary embolism (PE) suspected, unknown D-dimer;  chest radiograph 10/22/2023    TECHNIQUE:  Helically acquired images with axial, sagittal and coronal reformations were obtained from the thoracic inlet to the lung bases followingthe IV administration of contrast.  CTA timed for evaluation of the pulmonary arteries.  MIP images were performed.    Automated tube current modulation, weight-based exposure dosing, and/or iterative reconstruction technique utilized to reach  lowest reasonably achievable exposure rate.    DLP: 1022 mGy*cm    COMPARISON:  No relevant priors available for comparison at time of this dictation    FINDINGS:  BASE OF NECK: No significant abnormality.    AORTA: Left-sided aortic arch.  No aneurysm and no significant atherosclerosis    PULMONARY VASCULATURE: Pulmonary arteries enhance normally.  No evidence of pulmonary embolus.    HEART: Mild cardiomegaly.   There are coronary artery calcifications.    CAMILO/MEDIASTINUM: Mediastinal lymph nodes measure up to 1.2 cm short axis.    AIRWAYS: Patent.    LUNGS/PLEURA: There are small layering pleural effusions.  Dependent atelectasis.  There is septal thickening and ground-glass opacities within the lungs.    UPPER ABDOMEN: See separately dictated report for CT of the abdomen and pelvis    THORACIC SOFT TISSUES: Unremarkable.    BONES: No acute fracture. No suspicious lytic or sclerotic lesions.  Impression: 1. No evidence of pulmonary embolus  2. Mild cardiomegaly with pleural effusions and findings suggestive of interstitial and early alveolar edema.  Correlate with BNP.    Electronically signed by: Luz Elena Clifford  Date:    10/22/2023  Time:    08:24        ASSESSMENT & PLAN:   Assessment:  Acute dyspnea   Cardiomegaly with pleural effusions and findings of interstitial and early alveolar edema   Elevated BNP   Acute hypoxemic respiratory failure, likely secondary to above  Hypertensive urgency  Leukocytosis  Bladder tumor s/p removal and cystocopy at [facility] on 10/19/2023  DM 2  HLD  CAD      Plan:  Echo   IV Lasix 20 mg BID  Strict intake/output   Daily weights  Continue supplemental oxygen, wean as tolerated  CTA negative for PE  Insulin sliding scale with accu-checks   Continue appropriate home medications once med rec updated   Labs in a.m.    VTE Prophylaxis: Lovenox     __________________________________________________________________________  INPATIENT LIST OF MEDICATIONS     Scheduled Meds:    cloNIDine  0.1 mg Oral ED 1 Time    [START ON 10/23/2023] furosemide (LASIX) injection  20 mg Intravenous Daily    sodium chloride 0.9%  250 mL Intravenous ED 1 Time     Continuous Infusions:  PRN Meds:.      Guillermo MAST PA-C, have reviewed and discussed the case with Dr. Misti Spicer MD  Please see the following addendum for further assessment and plan from there attending MD.    10/22/2023    ________________________________________________________________________________    MD Addendum:  Dr. Nayan MAST assumed care of this patient today at around 10:00 a.m.  For the patient encounter, I performed the substantive portion of the visit, I reviewed the PA documentation, treatment plan, and medical decision making.  I had face to face time with this patient     A. History:    63-year-old  female with significant history of HTN, HLD, CAD status post remote PCI in 2004 currently only on aspirin.  Patient also was diagnosed with bladder cancer early stage in 2023 and underwent tumor resection/BCG treatment.  Patient follows up with MD Reyes, she was recently in [facility] for cystoscopy on 10/19.  Patient was discharged home on 10/21, after reaching home patient felt significantly tired, weak and was short of breath . shortness of breaths progressively worsened which prompted the family to bring the patient to the ED. patient was significantly hypertensive in the ED. lab significant for mild leukocytosis.  EKG-NSR.  CT imaging concerning for pulmonary vascular congestion/alveolar edema.  No PE . patient received IV Lasix 20 mg in the ED.  Hospitalist team was consulted for admission.  No previous history of CHF .  patient is on close follow-up with a primary cardiologist Dr. Michelle MUNOZ. Physical exam:  Agree with above    C. Medical decision making:    Acute decompensated heart failure with unknown ejection fraction   Bilateral pleural effusion/alveolar edema secondary to above  Acute hypoxemic  respiratory failure secondary to above   Sirs with leukocytosis-likely stress induced   History of bladder cancer status post tumor resection/BCG treatment  Poorly-controlled HTN   HLD  History of CAD status post PCI in 2004  GERD  Prophylaxis    Troponins negative, no chest pain  IV Lasix 20 mg b.i.d.  Monitor strict Is&Os   Transthoracic echocardiogram ordered, follow-up results   Supplemental oxygen to keep saturations more than 92%   Leukocytosis noted, no concern for infection as of now   CT findings not consistent with pneumonia  UA is negative   In case of worsening will consider additional septic workup  Blood pressure poorly controlled   Coreg increased to 6.25 mg b.i.d.  Resumed valsartan   Resumed other home meds-ppi, tamsulosin, aspirin and statin  DVT prophylaxis-subQ Lovenox      All diagnosis and differential diagnosis have been reviewed; assessment and plan has been documented; I have personally reviewed the labs and test results that are presently available; I have reviewed the patients medication list; I have reviewed the consulting providers response and recommendations. I have reviewed or attempted to review medical records based upon their availability.    All of the patient and family questions have been addressed and answered. Patient's is agreeable to the above stated plan. I will continue to monitor closely and make adjustments to medical management as needed.      10/22/2023

## 2023-10-22 NOTE — ED PROVIDER NOTES
Encounter Date: 10/22/2023       History     Chief Complaint   Patient presents with    Abdominal Pain    Vomiting     Hx of bladder cancer had cystoscopy w/ fulguration on Thursday at [facility], was d/c'd yesterday and took out her perez catheter yesterday as well, pt began having bladder spasms and n/v this AM, denies hematuria, retention, or fever.      Patient presents 2 days after bladder tumor removal by cystoscopy on Wednesday states it was done at [facility] states was doing fine after the surgery she was not intubated patient states that she got home yesterday and then this morning started having trouble breathing and some cramping in the lower abdomen she did not take her bladder spasm pain.  Patient denies any fevers or chills denies any cough states it just feels like her chest is tight.  No significant swelling or edema no nausea vomiting or diarrhea    The history is provided by the patient.     Review of patient's allergies indicates:   Allergen Reactions    Gadolinium-containing contrast media Rash and Shortness Of Breath    Morphine Swelling, Itching, Rash and Shortness Of Breath     Other reaction(s): trouble breathing, rash  Other reaction(s): trouble breathing, rash    Tramadol Swelling     Other reaction(s): throat closing  Other reaction(s): Throat swelling  Other reaction(s): throat closing, Throat swelling  Other reaction(s): throat closing  Other reaction(s): Throat swelling    Duloxetine Other (See Comments)     Other reaction(s): SUCIDIAL THOUGHTS  Other reaction(s): SUCIDIAL THOUGHTS  Other reaction(s): SUCIDIAL THOUGHTS    Metformin Other (See Comments)     Other reaction(s): vomiting  Other reaction(s): Vomiting  Other reaction(s): Vomiting  Other reaction(s): vomiting  Other reaction(s): Vomiting    Morphine sulfate      Other reaction(s): trouble breathing, rash    Morpholine analogues     Clindamycin hcl      GERD     Iodinated contrast media Rash     Other reaction(s): rash      Past Medical History:   Diagnosis Date    Coronary artery disease     Diabetes mellitus     Digestive disorder     Hypertension      Past Surgical History:   Procedure Laterality Date    CORONARY STENT PLACEMENT N/A     cardiac stent X 1    ESOPHAGOGASTRODUODENOSCOPY N/A 2022    Procedure: EGD (ESOPHAGOGASTRODUODENOSCOPY);  Surgeon: Keon Vieira MD;  Location: [facility] OR;  Service: General;  Laterality: N/A;    gastric sleeve N/A 2019    HYSTERECTOMY N/A     partial    TUBAL LIGATION N/A      Family History   Problem Relation Age of Onset    Arthritis Mother         Rheumatoid    Depression Mother     Hypertension Maternal Grandfather     Diabetes Maternal Grandmother     Heart disease Maternal Grandmother     Hypertension Maternal Grandmother     Stroke Paternal Grandmother     COPD Sister         Osteo    Depression Sister     Diabetes Sister     Hypertension Sister     Depression Sister     Drug abuse Sister     Diabetes Maternal Aunt     Diabetes Maternal Uncle     Early death Sister         34    Heart disease Maternal Aunt     Kidney disease Maternal Aunt     Hypertension Brother      Social History     Tobacco Use    Smoking status: Former     Current packs/day: 0.00     Types: Cigarettes     Start date: 2022     Quit date: 2023     Years since quittin.4    Smokeless tobacco: Never   Substance Use Topics    Alcohol use: Not Currently    Drug use: Never     Review of Systems   Constitutional:  Negative for fever.   HENT:  Negative for sore throat.    Respiratory:  Positive for chest tightness and shortness of breath.    Cardiovascular:  Negative for chest pain.   Gastrointestinal:  Positive for abdominal pain. Negative for nausea.   Genitourinary:  Negative for difficulty urinating, dyspareunia, dysuria, menstrual problem, vaginal bleeding and vaginal discharge.   Musculoskeletal:  Negative for back pain.   Skin:  Negative for rash.   Neurological:  Negative for weakness.    Hematological:  Does not bruise/bleed easily.       Physical Exam     Initial Vitals [10/22/23 0347]   BP Pulse Resp Temp SpO2   (!) 209/88 90 19 97.9 °F (36.6 °C) 95 %      MAP       --         Physical Exam    Constitutional: She appears well-developed and well-nourished.   Weak pale   HENT:   Head: Normocephalic and atraumatic.   Mouth/Throat: Oropharynx is clear and moist.   Eyes: Conjunctivae are normal. Pupils are equal, round, and reactive to light.   Neck: Neck supple.   Normal range of motion.  Cardiovascular:  Normal rate, regular rhythm and normal heart sounds.           Pulmonary/Chest: She has no wheezes. She has rhonchi. She has no rales.   Abdominal: Abdomen is soft. Bowel sounds are normal.   Mild suprapubic abdominal tenderness   Musculoskeletal:         General: Normal range of motion.      Cervical back: Normal range of motion and neck supple.     Neurological: She is alert and oriented to person, place, and time. GCS score is 15. GCS eye subscore is 4. GCS verbal subscore is 5. GCS motor subscore is 6.   Skin: Skin is warm and dry. Capillary refill takes less than 2 seconds.   Psychiatric: She has a normal mood and affect. Her behavior is normal. Judgment and thought content normal.         ED Course   Critical Care    Date/Time: 10/22/2023 9:40 AM    Performed by: Yassine Huitron III, MD  Authorized by: Yassine Huitron III, MD  Direct patient critical care time: 25 minutes  Documentation critical care time: 5 minutes  Consulting other physicians critical care time: 5 minutes  Total critical care time (exclusive of procedural time) : 35 minutes  Critical care was necessary to treat or prevent imminent or life-threatening deterioration of the following conditions: metabolic crisis and cardiac failure.  Critical care was time spent personally by me on the following activities: discussions with primary provider, discussions with consultants, examination of patient, re-evaluation of patient's  condition, review of old charts, ordering and review of laboratory studies and ordering and performing treatments and interventions.        Labs Reviewed   COMPREHENSIVE METABOLIC PANEL - Abnormal; Notable for the following components:       Result Value    Glucose Level 184 (*)     Aspartate Aminotransferase 41 (*)     All other components within normal limits   B-TYPE NATRIURETIC PEPTIDE - Abnormal; Notable for the following components:    Natriuretic Peptide 469.5 (*)     All other components within normal limits   URINALYSIS, REFLEX TO URINE CULTURE - Abnormal; Notable for the following components:    Protein, UA 1+ (*)     Blood, UA Trace (*)     All other components within normal limits   CBC WITH DIFFERENTIAL - Abnormal; Notable for the following components:    WBC 13.91 (*)     MCH 31.6 (*)     MPV 12.0 (*)     Neut # 10.36 (*)     IG# 0.07 (*)     All other components within normal limits   POCT GLUCOSE - Abnormal; Notable for the following components:    POCT Glucose 186 (*)     All other components within normal limits   MAGNESIUM - Normal   TROPONIN I - Normal   URINALYSIS, MICROSCOPIC - Normal   CBC W/ AUTO DIFFERENTIAL    Narrative:     The following orders were created for panel order CBC auto differential.  Procedure                               Abnormality         Status                     ---------                               -----------         ------                     CBC with Differential[[id number]]       Abnormal            Final result                 Please view results for these tests on the individual orders.          Imaging Results              CTA Chest Non-Coronary (PE Studies) (Final result)  Result time 10/22/23 08:24:39      Final result by Luz Elena Clifford MD (10/22/23 08:24:39)                   Impression:      1. No evidence of pulmonary embolus  2. Mild cardiomegaly with pleural effusions and findings suggestive of interstitial and early alveolar edema.  Correlate  with BNP.      Electronically signed by: Luz Elena Clifford  Date:    10/22/2023  Time:    08:24               Narrative:    EXAMINATION:  CTA CHEST NON CORONARY (PE STUDIES)    CLINICAL HISTORY:  Pulmonary embolism (PE) suspected, unknown D-dimer;  chest radiograph 10/22/2023    TECHNIQUE:  Helically acquired images with axial, sagittal and coronal reformations were obtained from the thoracic inlet to the lung bases followingthe IV administration of contrast.  CTA timed for evaluation of the pulmonary arteries.  MIP images were performed.    Automated tube current modulation, weight-based exposure dosing, and/or iterative reconstruction technique utilized to reach lowest reasonably achievable exposure rate.    DLP: 1022 mGy*cm    COMPARISON:  No relevant priors available for comparison at time of this dictation    FINDINGS:  BASE OF NECK: No significant abnormality.    AORTA: Left-sided aortic arch.  No aneurysm and no significant atherosclerosis    PULMONARY VASCULATURE: Pulmonary arteries enhance normally.  No evidence of pulmonary embolus.    HEART: Mild cardiomegaly.   There are coronary artery calcifications.    CAMILO/MEDIASTINUM: Mediastinal lymph nodes measure up to 1.2 cm short axis.    AIRWAYS: Patent.    LUNGS/PLEURA: There are small layering pleural effusions.  Dependent atelectasis.  There is septal thickening and ground-glass opacities within the lungs.    UPPER ABDOMEN: See separately dictated report for CT of the abdomen and pelvis    THORACIC SOFT TISSUES: Unremarkable.    BONES: No acute fracture. No suspicious lytic or sclerotic lesions.                                       CT Abdomen Pelvis With Contrast (Final result)  Result time 10/22/23 08:39:44      Final result by Luz Elena Clifford MD (10/22/23 08:39:44)                   Impression:      No significant intra-abdominal abnormality    Trace free intraperitoneal fluid      Electronically signed by: Luz Elena  Darrin  Date:    10/22/2023  Time:    08:39               Narrative:    EXAMINATION:  CT ABDOMEN PELVIS WITH CONTRAST    CLINICAL HISTORY:  Abdominal pain, post-op;post bladder sx ab pain;    TECHNIQUE:  Helically acquired images with axial, sagittal and coronal reformations were obtained from the lung bases to the pubic symphysis after the IV administration of contrast.    Automated tube current modulation, weight-based exposure dosing, and/or iterative reconstruction technique utilized to reach lowest reasonably achievable exposure rate.    DLP: 1022 mGy*cm    COMPARISON:  CT abdomen pelvis 02/23/2023    FINDINGS:  LUNG BASES: See separate report for CT of the chest.    LIVER: Slightly mottled attenuation of the liver may be related to passive hepatic congestion    BILIARY: No calcified gallstones.    PANCREAS: No inflammatory change.    SPLEEN: Calcified granulomata.    ADRENALS: No mass.    KIDNEYS/URETERS: The kidneys enhance symmetrically.  No hydronephrosis.    GI TRACT/MESENTERY: Postop sleeve gastrectomy.  No evidence of bowel obstruction or inflammation. The appendix is normal.    PERITONEUM: Trace free fluid.No free air.    LYMPH NODES: No enlarged lymph nodes by size criteria.    VASCULATURE: Aortoiliac atherosclerosis.    BLADDER: Previously seen bladder mass is not readily appreciable.  Defer to cystoscopy.    REPRODUCTIVE ORGANS: Uterus is surgically absent.    SOFT TISSUES: Unremarkable.    BONES: Degenerative changes at the hips.                                       X-Ray Chest AP Portable (Final result)  Result time 10/22/23 08:40:25      Final result by Luz Elena Clifford MD (10/22/23 08:40:25)                   Impression:      Enlarged cardiac silhouette with interstitial edema      Electronically signed by: Luz Elena Clifford  Date:    10/22/2023  Time:    08:40               Narrative:    EXAMINATION:  XR CHEST AP PORTABLE    CLINICAL HISTORY:  shortness of breath;    TECHNIQUE:  Single  frontal view of the chest was performed.    COMPARISON:  07/07/2023    FINDINGS:  LINES AND TUBES: None    MEDIASTINUM AND CAMILO: Cardiac silhouette is enlarged.    LUNGS: Basilar and perihilar interstitial opacities with Kerley B lines.    PLEURA:Trace left pleural fluid.No pneumothorax.    BONES: No acute osseous abnormality.                                       Medications   cloNIDine tablet 0.1 mg (0 mg Oral Hold 10/22/23 0530)   sodium chloride 0.9% bolus 250 mL 250 mL (250 mLs Intravenous Not Given 10/22/23 0730)   furosemide injection 20 mg (has no administration in time range)   HYDROmorphone (PF) injection 1 mg (1 mg Intravenous Given 10/22/23 0608)   ondansetron injection 4 mg (4 mg Intravenous Given 10/22/23 0608)   albuterol-ipratropium 2.5 mg-0.5 mg/3 mL nebulizer solution 3 mL (3 mLs Nebulization Given 10/22/23 0630)   methylPREDNISolone sodium succinate injection 125 mg (125 mg Intravenous Given 10/22/23 0725)   diphenhydrAMINE injection 25 mg (25 mg Intravenous Given 10/22/23 0726)   ondansetron injection 4 mg (4 mg Intravenous Given 10/22/23 0725)   iopamidoL (ISOVUE-370) injection 100 mL (100 mLs Intravenous Given 10/22/23 0757)   furosemide injection 20 mg (20 mg Intravenous Given 10/22/23 0915)     Medical Decision Making  Pneumonia atelectasis pulmonary embolus CHF postsurgical pain    Patient CBC done with mild leukocytosis normal H&H patient with rales bilaterally recent surgical procedure with immobility patient chest x-ray with congestive changes was hypertensive was given clonidine prior to patient being brought back.  Patient given neb treatment with some mild improvement patient was given hydromorphone for pain it did cause her to get diaphoretic and felt weak CT chest abdomen pelvis done after giving Solu-Medrol Benadryl for previous allergic reaction to contrast no pulmonary embolus congestive changes new onset CHF will admit for diuresis to Hospital Medicine    Problems Addressed:  CHF  (congestive heart failure): complicated acute illness or injury  SOB (shortness of breath): acute illness or injury    Amount and/or Complexity of Data Reviewed  Radiology: ordered.    Risk  Prescription drug management.  Decision regarding hospitalization.                               Clinical Impression:   Final diagnoses:  [R06.02] SOB (shortness of breath)  [I50.9] CHF (congestive heart failure)        ED Disposition Condition    Admit                 Yassine Huitron III, MD  10/22/23 0979

## 2023-10-23 LAB
ALBUMIN SERPL-MCNC: 3.6 G/DL (ref 3.4–4.8)
ALBUMIN/GLOB SERPL: 1.4 RATIO (ref 1.1–2)
ALP SERPL-CCNC: 86 UNIT/L (ref 40–150)
ALT SERPL-CCNC: 23 UNIT/L (ref 0–55)
AST SERPL-CCNC: 12 UNIT/L (ref 5–34)
BASOPHILS # BLD AUTO: 0.02 X10(3)/MCL
BASOPHILS NFR BLD AUTO: 0.2 %
BILIRUB SERPL-MCNC: 0.7 MG/DL
BUN SERPL-MCNC: 20.2 MG/DL (ref 9.8–20.1)
CALCIUM SERPL-MCNC: 9.2 MG/DL (ref 8.4–10.2)
CHLORIDE SERPL-SCNC: 100 MMOL/L (ref 98–107)
CO2 SERPL-SCNC: 31 MMOL/L (ref 23–31)
CREAT SERPL-MCNC: 0.76 MG/DL (ref 0.55–1.02)
EOSINOPHIL # BLD AUTO: 0 X10(3)/MCL (ref 0–0.9)
EOSINOPHIL NFR BLD AUTO: 0 %
ERYTHROCYTE [DISTWIDTH] IN BLOOD BY AUTOMATED COUNT: 12 % (ref 11.5–17)
GFR SERPLBLD CREATININE-BSD FMLA CKD-EPI: >60 MLS/MIN/1.73/M2
GLOBULIN SER-MCNC: 2.5 GM/DL (ref 2.4–3.5)
GLUCOSE SERPL-MCNC: 236 MG/DL (ref 82–115)
HCT VFR BLD AUTO: 36.4 % (ref 37–47)
HGB BLD-MCNC: 12.7 G/DL (ref 12–16)
IMM GRANULOCYTES # BLD AUTO: 0.05 X10(3)/MCL (ref 0–0.04)
IMM GRANULOCYTES NFR BLD AUTO: 0.5 %
LYMPHOCYTES # BLD AUTO: 1.34 X10(3)/MCL (ref 0.6–4.6)
LYMPHOCYTES NFR BLD AUTO: 12.3 %
MCH RBC QN AUTO: 31.6 PG (ref 27–31)
MCHC RBC AUTO-ENTMCNC: 34.9 G/DL (ref 33–36)
MCV RBC AUTO: 90.5 FL (ref 80–94)
MONOCYTES # BLD AUTO: 0.89 X10(3)/MCL (ref 0.1–1.3)
MONOCYTES NFR BLD AUTO: 8.2 %
NEUTROPHILS # BLD AUTO: 8.56 X10(3)/MCL (ref 2.1–9.2)
NEUTROPHILS NFR BLD AUTO: 78.8 %
NRBC BLD AUTO-RTO: 0 %
PLATELET # BLD AUTO: 159 X10(3)/MCL (ref 130–400)
PMV BLD AUTO: 12.4 FL (ref 7.4–10.4)
POCT GLUCOSE: 125 MG/DL (ref 70–110)
POCT GLUCOSE: 344 MG/DL (ref 70–110)
POTASSIUM SERPL-SCNC: 4.1 MMOL/L (ref 3.5–5.1)
PROT SERPL-MCNC: 6.1 GM/DL (ref 5.8–7.6)
RBC # BLD AUTO: 4.02 X10(6)/MCL (ref 4.2–5.4)
SODIUM SERPL-SCNC: 140 MMOL/L (ref 136–145)
WBC # SPEC AUTO: 10.86 X10(3)/MCL (ref 4.5–11.5)

## 2023-10-23 PROCEDURE — 11000001 HC ACUTE MED/SURG PRIVATE ROOM

## 2023-10-23 PROCEDURE — 63600175 PHARM REV CODE 636 W HCPCS: Performed by: INTERNAL MEDICINE

## 2023-10-23 PROCEDURE — 63600175 PHARM REV CODE 636 W HCPCS: Performed by: PHYSICIAN ASSISTANT

## 2023-10-23 PROCEDURE — 25000003 PHARM REV CODE 250: Performed by: INTERNAL MEDICINE

## 2023-10-23 PROCEDURE — 80053 COMPREHEN METABOLIC PANEL: CPT | Performed by: PHYSICIAN ASSISTANT

## 2023-10-23 PROCEDURE — 85025 COMPLETE CBC W/AUTO DIFF WBC: CPT | Performed by: PHYSICIAN ASSISTANT

## 2023-10-23 PROCEDURE — 21400001 HC TELEMETRY ROOM

## 2023-10-23 RX ORDER — FUROSEMIDE 10 MG/ML
20 INJECTION INTRAMUSCULAR; INTRAVENOUS ONCE
Status: DISCONTINUED | OUTPATIENT
Start: 2023-10-23 | End: 2023-10-23

## 2023-10-23 RX ORDER — FUROSEMIDE 10 MG/ML
40 INJECTION INTRAMUSCULAR; INTRAVENOUS
Status: DISCONTINUED | OUTPATIENT
Start: 2023-10-23 | End: 2023-10-24

## 2023-10-23 RX ORDER — PHENAZOPYRIDINE HYDROCHLORIDE 100 MG/1
100 TABLET, FILM COATED ORAL 3 TIMES DAILY PRN
Status: DISCONTINUED | OUTPATIENT
Start: 2023-10-23 | End: 2023-10-26 | Stop reason: HOSPADM

## 2023-10-23 RX ORDER — VALSARTAN 80 MG/1
80 TABLET ORAL 2 TIMES DAILY
Status: DISCONTINUED | OUTPATIENT
Start: 2023-10-23 | End: 2023-10-26

## 2023-10-23 RX ADMIN — ASPIRIN 81 MG: 81 TABLET, COATED ORAL at 10:10

## 2023-10-23 RX ADMIN — FUROSEMIDE 20 MG: 10 INJECTION, SOLUTION INTRAMUSCULAR; INTRAVENOUS at 03:10

## 2023-10-23 RX ADMIN — CARVEDILOL 6.25 MG: 3.12 TABLET, FILM COATED ORAL at 10:10

## 2023-10-23 RX ADMIN — PANTOPRAZOLE SODIUM 40 MG: 40 TABLET, DELAYED RELEASE ORAL at 10:10

## 2023-10-23 RX ADMIN — PHENAZOPYRIDINE HYDROCHLORIDE 100 MG: 100 TABLET ORAL at 07:10

## 2023-10-23 RX ADMIN — INSULIN ASPART 8 UNITS: 100 INJECTION, SOLUTION INTRAVENOUS; SUBCUTANEOUS at 03:10

## 2023-10-23 RX ADMIN — CARVEDILOL 6.25 MG: 3.12 TABLET, FILM COATED ORAL at 04:10

## 2023-10-23 RX ADMIN — TAMSULOSIN HYDROCHLORIDE 0.4 MG: 0.4 CAPSULE ORAL at 10:10

## 2023-10-23 RX ADMIN — FUROSEMIDE 40 MG: 10 INJECTION, SOLUTION INTRAMUSCULAR; INTRAVENOUS at 05:10

## 2023-10-23 RX ADMIN — PANTOPRAZOLE SODIUM 40 MG: 40 TABLET, DELAYED RELEASE ORAL at 08:10

## 2023-10-23 RX ADMIN — VALSARTAN 80 MG: 80 TABLET, FILM COATED ORAL at 10:10

## 2023-10-23 RX ADMIN — ENOXAPARIN SODIUM 40 MG: 40 INJECTION SUBCUTANEOUS at 04:10

## 2023-10-23 RX ADMIN — ATORVASTATIN CALCIUM 40 MG: 40 TABLET, FILM COATED ORAL at 10:10

## 2023-10-23 RX ADMIN — VALSARTAN 80 MG: 80 TABLET, FILM COATED ORAL at 08:10

## 2023-10-23 NOTE — NURSING
Nurses Note -- 4 Eyes      10/23/2023   5:31 PM      Skin assessed during: Admit      [x] No Altered Skin Integrity Present    []Prevention Measures Documented      [] Yes- Altered Skin Integrity Present or Discovered   [] LDA Added if Not in Epic (Describe Wound)   [] New Altered Skin Integrity was Present on Admit and Documented in LDA   [] Wound Image Taken    Wound Care Consulted? No    Attending Nurse:  Zully Mccartney LPN     Second RN/Staff Member:   Christophe Lowery

## 2023-10-23 NOTE — PROGRESS NOTES
[facility] Hospital Medicine Progress Note        Chief Complaint: Inpatient Follow-up    HPI:      63-year-old  female with significant history of HTN, HLD, CAD status post remote PCI in 2004 currently only on aspirin.  Patient also was diagnosed with bladder cancer early stage in 2023 and underwent tumor resection/BCG treatment.  Patient follows up with MD Reyse, she was recently in [facility] for cystoscopy on 10/19.  Patient was discharged home on 10/21, after reaching home patient felt significantly tired, weak and was short of breath . shortness of breaths progressively worsened which prompted the family to bring the patient to the ED. patient was significantly hypertensive in the ED. lab significant for mild leukocytosis.  EKG-NSR.  CT imaging concerning for pulmonary vascular congestion/alveolar edema.  No PE . patient received IV Lasix 20 mg in the ED.  Hospitalist team was consulted for admission.  No previous history of CHF .  patient is on close follow-up with a primary cardiologist Dr. Martinez.  Troponins negative.  No chest pain.  Initiated IV Lasix paid transthoracic echocardiogram ordered.  Supplemental oxygen to keep saturations above 92%.  No pneumonia, PE. leukocytosis likely stress induced.     Interval Hx:   Patient seen at bedside.  Saturations mid 90s on nasal cannula oxygen.  She is on 2 L . blood pressure slightly accelerated.  Otherwise hemodynamics are stable.  No new complaints paid overall feeling better compared to admit.    Objective/physical exam:  General: In no acute distress, afebrile  Chest: Clear to auscultation bilaterally  Heart: S1, S2, no appreciable murmur  Abdomen: Soft, nontender, BS +  MSK: Warm, no lower extremity edema, no clubbing or cyanosis  Neurologic: Alert and oriented x4, moving all extremities with good strength     VITAL SIGNS: 24 HRS MIN & MAX LAST   No data recorded 97.9 °F (36.6 °C)   BP  Min: 141/61  Max: 179/81  (!) 147/68   Pulse  Min: 50  Max: 75  (!) 51   Resp  Min: 11  Max: 20 18   SpO2  Min: 95 %  Max: 98 % 98 %       Recent Labs   Lab 10/23/23  0433   WBC 10.86   RBC 4.02*   HGB 12.7   HCT 36.4*   MCV 90.5   MCH 31.6*   MCHC 34.9   RDW 12.0      MPV 12.4*         Recent Labs   Lab 10/22/23  0432 10/23/23  0433    140   K 4.2 4.1   CO2 30 31   BUN 11.8 20.2*   CREATININE 0.71 0.76   CALCIUM 9.7 9.2   MG 1.60  --    ALBUMIN 4.2 3.6   ALKPHOS 106 86   ALT 38 23   AST 41* 12   BILITOT 0.9 0.7          Microbiology Results (last 7 days)       ** No results found for the last 168 hours. **             Scheduled Med:   aspirin  81 mg Oral Daily    atorvastatin  40 mg Oral Daily    carvediloL  6.25 mg Oral BID WM    enoxparin  40 mg Subcutaneous Daily    furosemide (LASIX) injection  20 mg Intravenous Q12H    pantoprazole  40 mg Oral BID    tamsulosin  0.4 mg Oral Daily    valsartan  80 mg Oral Daily          Assessment/Plan:    Acute decompensated heart failure with  preserved ejection fraction   Bilateral pleural effusion/alveolar edema secondary to above  Acute hypoxemic respiratory failure secondary to above on 2 L nasal cannula  Sirs with leukocytosis-likely stress induced on admit-resolved  History of bladder cancer status post tumor resection/BCG treatment  Poorly-controlled HTN -improving  HLD  History of CAD status post PCI in 2004  GERD  Prophylaxis       Symptomatically better  But still on 2 L with saturation in mid 90s   I will increase Lasix to 40 mg b.i.d. for today  Monitor strict Is&Os  Troponins negative, no chest pain  Echocardiogram with normal ejection fraction, diastolic dysfunction noted  Leukocytosis resolved, likely was stress induced  CT findings not consistent with pneumonia  UA is negative   BP better after increasing Coreg to 6.25 mg b.i.d., continue valsartan systolic still slightly accelerated, but diastolic is in 60s and therefore will hold off on medication increase for  now  continue other home meds-ppi, tamsulosin, aspirin and statin  DVT prophylaxis-subQ Lovenox    Home in the next 1-2 days once weaned off O2    Misti Spicer MD   10/23/2023     Update  BP still accelerated, valsartan increased to 80 mg b.i.d.  P.r.n. phenazopyridine for bladder spasms

## 2023-10-23 NOTE — PLAN OF CARE
Problem: Infection  Goal: Absence of Infection Signs and Symptoms  Outcome: Ongoing, Progressing     Problem: Adult Inpatient Plan of Care  Goal: Plan of Care Review  Outcome: Ongoing, Progressing  Goal: Patient-Specific Goal (Individualized)  Outcome: Ongoing, Progressing  Goal: Absence of Hospital-Acquired Illness or Injury  Outcome: Ongoing, Progressing  Goal: Optimal Comfort and Wellbeing  Outcome: Ongoing, Progressing  Goal: Readiness for Transition of Care  Outcome: Ongoing, Progressing     Problem: Diabetes Comorbidity  Goal: Blood Glucose Level Within Targeted Range  Outcome: Ongoing, Progressing     Problem: Fall Injury Risk  Goal: Absence of Fall and Fall-Related Injury  Outcome: Ongoing, Progressing

## 2023-10-24 LAB
ANION GAP SERPL CALC-SCNC: 8 MEQ/L
BUN SERPL-MCNC: 25.5 MG/DL (ref 9.8–20.1)
CALCIUM SERPL-MCNC: 8.9 MG/DL (ref 8.4–10.2)
CHLORIDE SERPL-SCNC: 96 MMOL/L (ref 98–107)
CO2 SERPL-SCNC: 36 MMOL/L (ref 23–31)
CREAT SERPL-MCNC: 0.97 MG/DL (ref 0.55–1.02)
CREAT/UREA NIT SERPL: 26
GFR SERPLBLD CREATININE-BSD FMLA CKD-EPI: >60 MLS/MIN/1.73/M2
GLUCOSE SERPL-MCNC: 177 MG/DL (ref 82–115)
POCT GLUCOSE: 144 MG/DL (ref 70–110)
POCT GLUCOSE: 186 MG/DL (ref 70–110)
POTASSIUM SERPL-SCNC: 4 MMOL/L (ref 3.5–5.1)
SODIUM SERPL-SCNC: 140 MMOL/L (ref 136–145)

## 2023-10-24 PROCEDURE — 21400001 HC TELEMETRY ROOM

## 2023-10-24 PROCEDURE — 63600175 PHARM REV CODE 636 W HCPCS: Performed by: PHYSICIAN ASSISTANT

## 2023-10-24 PROCEDURE — 11000001 HC ACUTE MED/SURG PRIVATE ROOM

## 2023-10-24 PROCEDURE — 25000003 PHARM REV CODE 250: Performed by: INTERNAL MEDICINE

## 2023-10-24 PROCEDURE — 80048 BASIC METABOLIC PNL TOTAL CA: CPT | Performed by: INTERNAL MEDICINE

## 2023-10-24 PROCEDURE — 25000003 PHARM REV CODE 250: Performed by: PHYSICIAN ASSISTANT

## 2023-10-24 RX ORDER — FUROSEMIDE 40 MG/1
40 TABLET ORAL DAILY
Status: DISCONTINUED | OUTPATIENT
Start: 2023-10-25 | End: 2023-10-26 | Stop reason: HOSPADM

## 2023-10-24 RX ADMIN — TAMSULOSIN HYDROCHLORIDE 0.4 MG: 0.4 CAPSULE ORAL at 09:10

## 2023-10-24 RX ADMIN — PANTOPRAZOLE SODIUM 40 MG: 40 TABLET, DELAYED RELEASE ORAL at 08:10

## 2023-10-24 RX ADMIN — PANTOPRAZOLE SODIUM 40 MG: 40 TABLET, DELAYED RELEASE ORAL at 09:10

## 2023-10-24 RX ADMIN — ENOXAPARIN SODIUM 40 MG: 40 INJECTION SUBCUTANEOUS at 04:10

## 2023-10-24 RX ADMIN — VALSARTAN 80 MG: 80 TABLET, FILM COATED ORAL at 09:10

## 2023-10-24 RX ADMIN — VALSARTAN 80 MG: 80 TABLET, FILM COATED ORAL at 08:10

## 2023-10-24 RX ADMIN — INSULIN ASPART 2 UNITS: 100 INJECTION, SOLUTION INTRAVENOUS; SUBCUTANEOUS at 12:10

## 2023-10-24 RX ADMIN — ACETAMINOPHEN 650 MG: 325 TABLET, FILM COATED ORAL at 06:10

## 2023-10-24 RX ADMIN — ATORVASTATIN CALCIUM 40 MG: 40 TABLET, FILM COATED ORAL at 08:10

## 2023-10-24 RX ADMIN — CARVEDILOL 6.25 MG: 3.12 TABLET, FILM COATED ORAL at 04:10

## 2023-10-24 RX ADMIN — CARVEDILOL 6.25 MG: 3.12 TABLET, FILM COATED ORAL at 06:10

## 2023-10-24 RX ADMIN — ONDANSETRON 4 MG: 2 INJECTION INTRAMUSCULAR; INTRAVENOUS at 03:10

## 2023-10-24 RX ADMIN — ASPIRIN 81 MG: 81 TABLET, COATED ORAL at 09:10

## 2023-10-24 NOTE — PROGRESS NOTES
"[facility]  Hospital Medicine Progress Note      Chief Complaint: tired, weak, short of breath     HPI:   63-year-old  female with significant history of HTN, HLD, CAD status post remote PCI in 2004 currently only on aspirin.  Patient also was diagnosed with bladder cancer early stage in 2023 and underwent tumor resection/BCG treatment.  Patient follows up with [facility], she was recently in [facility]oscopy on 10/19.  Patient was discharged home on 10/21, after reaching home patient felt significantly tired, weak and was short of breath . shortness of breaths progressively worsened which prompted the family to bring the patient to the ED. patient was significantly hypertensive in the ED. lab significant for mild leukocytosis.  EKG-NSR.  CT imaging concerning for pulmonary vascular congestion/alveolar edema.  No PE . patient received IV Lasix 20 mg in the ED.  Hospitalist team was consulted for admission.  No previous history of CHF .  patient is on close follow-up with a primary cardiologist Dr. Martinez.  Troponins negative.  No chest pain.  Initiated IV Lasix paid transthoracic echocardiogram ordered.  Supplemental oxygen to keep saturations above 92%.  No pneumonia, PE. leukocytosis likely stress induced.     Patient concerned about "darker urine" otherwise no complaints.  __________________________________________________________________________________________________________________________________      Objective/physical exam:  Vital signs have been personally reviewed by me   Neuro: awake, alert, oriented    General: Appears comfortable, no acute distress.  Integumentary: Warm, dry, intact.  Musculoskeletal: Purposeful movement noted.   Respiratory: No accessory muscle use. Breath sounds are equal.  Cardiovascular: Regular rate. No peripheral edema.    VITAL SIGNS: 24 HRS MIN & MAX LAST   Temp  Min: 97.3 °F (36.3 °C)  Max: 97.9 °F (36.6 °C) 97.3 °F (36.3 °C) "   BP  Min: 147/79  Max: 181/78 (!) 153/70   Pulse  Min: 53  Max: 73  (!) 56   Resp  Min: 16  Max: 20 20   SpO2  Min: 94 %  Max: 97 % 95 %     Echo    Left Ventricle: The left ventricle is normal in size. Mildly increased   wall thickness. Normal wall motion. There is normal systolic function with   a visually estimated ejection fraction of 60 - 65%. Grade I diastolic   dysfunction.    Left Atrium: Left atrium is mildly dilated.    Right Ventricle: Normal right ventricular cavity size. Systolic   function is normal.    Aortic Valve: Aortic valve peak velocity is 1.73 m/s. Mean gradient is   7 mmHg. There is no significant regurgitation.    Mitral Valve: There is no stenosis.  X-Ray Chest AP Portable  Narrative: EXAMINATION:  XR CHEST AP PORTABLE    CLINICAL HISTORY:  shortness of breath;    TECHNIQUE:  Single frontal view of the chest was performed.    COMPARISON:  07/07/2023    FINDINGS:  LINES AND TUBES: None    MEDIASTINUM AND CAMILO: Cardiac silhouette is enlarged.    LUNGS: Basilar and perihilar interstitial opacities with Kerley B lines.    PLEURA:Trace left pleural fluid.No pneumothorax.    BONES: No acute osseous abnormality.  Impression: Enlarged cardiac silhouette with interstitial edema    Electronically signed by: Luz Elena Clifford  Date:    10/22/2023  Time:    08:40  CT Abdomen Pelvis With Contrast  Narrative: EXAMINATION:  CT ABDOMEN PELVIS WITH CONTRAST    CLINICAL HISTORY:  Abdominal pain, post-op;post bladder sx ab pain;    TECHNIQUE:  Helically acquired images with axial, sagittal and coronal reformations were obtained from the lung bases to the pubic symphysis after the IV administration of contrast.    Automated tube current modulation, weight-based exposure dosing, and/or iterative reconstruction technique utilized to reach lowest reasonably achievable exposure rate.    DLP: 1022 mGy*cm    COMPARISON:  CT abdomen pelvis 02/23/2023    FINDINGS:  LUNG BASES: See separate report for CT of the  chest.    LIVER: Slightly mottled attenuation of the liver may be related to passive hepatic congestion    BILIARY: No calcified gallstones.    PANCREAS: No inflammatory change.    SPLEEN: Calcified granulomata.    ADRENALS: No mass.    KIDNEYS/URETERS: The kidneys enhance symmetrically.  No hydronephrosis.    GI TRACT/MESENTERY: Postop sleeve gastrectomy.  No evidence of bowel obstruction or inflammation. The appendix is normal.    PERITONEUM: Trace free fluid.No free air.    LYMPH NODES: No enlarged lymph nodes by size criteria.    VASCULATURE: Aortoiliac atherosclerosis.    BLADDER: Previously seen bladder mass is not readily appreciable.  Defer to cystoscopy.    REPRODUCTIVE ORGANS: Uterus is surgically absent.    SOFT TISSUES: Unremarkable.    BONES: Degenerative changes at the hips.  Impression: No significant intra-abdominal abnormality    Trace free intraperitoneal fluid    Electronically signed by: Luz Elena Clifford  Date:    10/22/2023  Time:    08:39  CTA Chest Non-Coronary (PE Studies)  Narrative: EXAMINATION:  CTA CHEST NON CORONARY (PE STUDIES)    CLINICAL HISTORY:  Pulmonary embolism (PE) suspected, unknown D-dimer;  chest radiograph 10/22/2023    TECHNIQUE:  Helically acquired images with axial, sagittal and coronal reformations were obtained from the thoracic inlet to the lung bases followingthe IV administration of contrast.  CTA timed for evaluation of the pulmonary arteries.  MIP images were performed.    Automated tube current modulation, weight-based exposure dosing, and/or iterative reconstruction technique utilized to reach lowest reasonably achievable exposure rate.    DLP: 1022 mGy*cm    COMPARISON:  No relevant priors available for comparison at time of this dictation    FINDINGS:  BASE OF NECK: No significant abnormality.    AORTA: Left-sided aortic arch.  No aneurysm and no significant atherosclerosis    PULMONARY VASCULATURE: Pulmonary arteries enhance normally.  No evidence of pulmonary  embolus.    HEART: Mild cardiomegaly.   There are coronary artery calcifications.    CAMILO/MEDIASTINUM: Mediastinal lymph nodes measure up to 1.2 cm short axis.    AIRWAYS: Patent.    LUNGS/PLEURA: There are small layering pleural effusions.  Dependent atelectasis.  There is septal thickening and ground-glass opacities within the lungs.    UPPER ABDOMEN: See separately dictated report for CT of the abdomen and pelvis    THORACIC SOFT TISSUES: Unremarkable.    BONES: No acute fracture. No suspicious lytic or sclerotic lesions.  Impression: 1. No evidence of pulmonary embolus  2. Mild cardiomegaly with pleural effusions and findings suggestive of interstitial and early alveolar edema.  Correlate with BNP.    Electronically signed by: Luz Elena Clifford  Date:    10/22/2023  Time:    08:24    Recent Labs   Lab 10/22/23  0432 10/23/23  0433   WBC 13.91* 10.86   RBC 4.55 4.02*   HGB 14.4 12.7   HCT 42.1 36.4*   MCV 92.5 90.5   MCH 31.6* 31.6*   MCHC 34.2 34.9   RDW 12.6 12.0    159   MPV 12.0* 12.4*       Recent Labs   Lab 10/22/23  0432 10/23/23  0433 10/24/23  0342    140 140   K 4.2 4.1 4.0   CO2 30 31 36*   BUN 11.8 20.2* 25.5*   CREATININE 0.71 0.76 0.97   CALCIUM 9.7 9.2 8.9   MG 1.60  --   --    ALBUMIN 4.2 3.6  --    ALKPHOS 106 86  --    ALT 38 23  --    AST 41* 12  --    BILITOT 0.9 0.7  --        Microbiology Results (last 7 days)       ** No results found for the last 168 hours. **          See below for Radiology    Scheduled Med:   aspirin  81 mg Oral Daily    atorvastatin  40 mg Oral Daily    carvediloL  6.25 mg Oral BID WM    enoxparin  40 mg Subcutaneous Daily    furosemide (LASIX) injection  40 mg Intravenous Q12H    pantoprazole  40 mg Oral BID    tamsulosin  0.4 mg Oral Daily    valsartan  80 mg Oral BID      PRN Meds:  acetaminophen, acetaminophen, cloNIDine, dextrose 10%, dextrose 10%, diazePAM, glucagon (human recombinant), glucose, glucose, insulin aspart U-100, ondansetron,  phenazopyridine     __________________________________________________________________________________________________________________________________      Assessment/Plan:  Acute decompensated heart failure   Bilateral pleural effusion/alveolar edema secondary to above  Acute hypoxemic respiratory failure secondary to above on 2 L nasal cannula  bladder cancer status post tumor resection/BCG treatment  Poorly-controlled HTN -improving  HLD  History of CAD status post PCI in 2004  GERD  _______________________________________________________________________________________________________________________________  Wean supplemental oxygen as tolerated  Continue lasix transitioned to oral agent---40mg daily  Monitor renal function and volume status   Continue supportive care  Continue checking vital signs q4hrs.     Nurse notified to page me if any changes occur   DVT prophylaxis initiated     I have spent >30 minutes on the day of the visit; time spent includes face to face time and non-face to face time preparing to see the patient (eg, review of tests), independently reviewing and interpreting medical records, both past and current; documenting clinical information in the electronic or other health record, and communicating results to the patient/family/caregiver and care coordinator and nursing team.      Anticipated discharge and Disposition when medically stable:  Pending     All diagnosis and differential diagnosis have been reviewed,  interpreted and communicated appropriately to care team. assessment and plan has been documented; I have personally reviewed the labs and test results that are presently available and pertinent to this hospital course; I have reviewed medical records based upon their availability.    All of the patient's questions have been  addressed and answered. Patient's is agreeable to the above stated plan.   I will continue to monitor closely and make adjustments to medical management as  needed.    Rebecca Vizcaino,    10/24/2023     This note was created with the assistance of Dragon voice recognition software. There may be transcription errors as a result of using this technology however minimal. Effort has been made to assure accuracy of transcription but any obvious errors or omissions should be clarified with the author of the document.

## 2023-10-24 NOTE — PLAN OF CARE
10/24/23 1005   Discharge Assessment   Assessment Type Discharge Planning Assessment   Confirmed/corrected address, phone number and insurance Yes   Confirmed Demographics Correct on Facesheet   Source of Information patient   Communicated DRE with patient/caregiver Date not available/Unable to determine   Reason For Admission 63-year-old  female with significant history of HTN, HLD, CAD status post remote PCI in 2004 currently only on aspirin.  Patient also was diagnosed with bladder cancer early stage in 2023 and underwent tumor resection/BCG treatment.  Patient follows up with [facility], she was recently in [facility] for cystoscopy on 10/19.  Patient was discharged home on 10/21, after reaching home patient felt significantly tired, weak and was short of breath . shortness of breaths progressively worsened which prompted the family to bring the patient to the ED. patient was significantly hypertensive in the ED.   People in Home alone   Facility Arrived From: Private residence.   Do you expect to return to your current living situation? Yes   Do you have help at home or someone to help you manage your care at home? Yes   Who are your caregiver(s) and their phone number(s)? Son: Tom Avalos (Son)   [phone] (Mobile)   Prior to hospitilization cognitive status: Alert/Oriented   Current cognitive status: Alert/Oriented   Home Accessibility   (The patient states that she lives in a (2) story home. She is ambulatory and requires no medical equipment.)   Home Layout Able to live on 1st floor   Equipment Currently Used at Home none   Readmission within 30 days? Yes   Patient currently being followed by outpatient case management? No   Do you currently have service(s) that help you manage your care at home? No   Do you take prescription medications? Yes   Do you have prescription coverage? Yes   Coverage Payor:  [facility]   Do you have any problems affording any of your  prescribed medications? No   Is the patient taking medications as prescribed? yes   Who is going to help you get home at discharge? Son: Tom Avalos (Son)   [phone] (Mobile)   How do you get to doctors appointments? car, drives self   Are you on dialysis? No   Do you take coumadin? No   DME Needed Upon Discharge  none   Discharge Plan discussed with: Patient   Transition of Care Barriers None   Discharge Plan A Home   Discharge Plan B Home with family   Social Connections   In a typical week, how many times do you talk on the phone with family, friends, or neighbors? Twice a week   How often do you get together with friends or relatives? Twice   How often do you attend Adventist or Restoration services? Never   Do you belong to any clubs or organizations such as Adventist groups, unions, fraternal or athletic groups, or school groups? No   How often do you attend meetings of the clubs or organizations you belong to? Never   Are you , , , , never , or living with a partner?    Alcohol Use   Q1: How often do you have a drink containing alcohol? Monthly or l   Q2: How many drinks containing alcohol do you have on a typical day when you are drinking? 1 or 2   Q3: How often do you have six or more drinks on one occasion? Never

## 2023-10-25 LAB
ANION GAP SERPL CALC-SCNC: 8 MEQ/L
BASOPHILS # BLD AUTO: 0.03 X10(3)/MCL
BASOPHILS NFR BLD AUTO: 0.4 %
BUN SERPL-MCNC: 23.1 MG/DL (ref 9.8–20.1)
CALCIUM SERPL-MCNC: 9.5 MG/DL (ref 8.4–10.2)
CHLORIDE SERPL-SCNC: 97 MMOL/L (ref 98–107)
CO2 SERPL-SCNC: 35 MMOL/L (ref 23–31)
CREAT SERPL-MCNC: 0.94 MG/DL (ref 0.55–1.02)
CREAT/UREA NIT SERPL: 25
EOSINOPHIL # BLD AUTO: 0.17 X10(3)/MCL (ref 0–0.9)
EOSINOPHIL NFR BLD AUTO: 2.3 %
ERYTHROCYTE [DISTWIDTH] IN BLOOD BY AUTOMATED COUNT: 12.2 % (ref 11.5–17)
GFR SERPLBLD CREATININE-BSD FMLA CKD-EPI: >60 MLS/MIN/1.73/M2
GLUCOSE SERPL-MCNC: 257 MG/DL (ref 82–115)
HCT VFR BLD AUTO: 42.2 % (ref 37–47)
HGB BLD-MCNC: 14.1 G/DL (ref 12–16)
IMM GRANULOCYTES # BLD AUTO: 0.02 X10(3)/MCL (ref 0–0.04)
IMM GRANULOCYTES NFR BLD AUTO: 0.3 %
LYMPHOCYTES # BLD AUTO: 2.21 X10(3)/MCL (ref 0.6–4.6)
LYMPHOCYTES NFR BLD AUTO: 29.5 %
MAGNESIUM SERPL-MCNC: 1.9 MG/DL (ref 1.6–2.6)
MCH RBC QN AUTO: 31.2 PG (ref 27–31)
MCHC RBC AUTO-ENTMCNC: 33.4 G/DL (ref 33–36)
MCV RBC AUTO: 93.4 FL (ref 80–94)
MONOCYTES # BLD AUTO: 0.82 X10(3)/MCL (ref 0.1–1.3)
MONOCYTES NFR BLD AUTO: 10.9 %
NEUTROPHILS # BLD AUTO: 4.25 X10(3)/MCL (ref 2.1–9.2)
NEUTROPHILS NFR BLD AUTO: 56.6 %
NRBC BLD AUTO-RTO: 0 %
PHOSPHATE SERPL-MCNC: 3.4 MG/DL (ref 2.3–4.7)
PLATELET # BLD AUTO: 184 X10(3)/MCL (ref 130–400)
PMV BLD AUTO: 11.6 FL (ref 7.4–10.4)
POCT GLUCOSE: 136 MG/DL (ref 70–110)
POCT GLUCOSE: 161 MG/DL (ref 70–110)
POCT GLUCOSE: 195 MG/DL (ref 70–110)
POCT GLUCOSE: 332 MG/DL (ref 70–110)
POTASSIUM SERPL-SCNC: 4.4 MMOL/L (ref 3.5–5.1)
RBC # BLD AUTO: 4.52 X10(6)/MCL (ref 4.2–5.4)
SODIUM SERPL-SCNC: 140 MMOL/L (ref 136–145)
WBC # SPEC AUTO: 7.5 X10(3)/MCL (ref 4.5–11.5)

## 2023-10-25 PROCEDURE — 63600175 PHARM REV CODE 636 W HCPCS: Performed by: PHYSICIAN ASSISTANT

## 2023-10-25 PROCEDURE — 11000001 HC ACUTE MED/SURG PRIVATE ROOM

## 2023-10-25 PROCEDURE — 83735 ASSAY OF MAGNESIUM: CPT | Performed by: STUDENT IN AN ORGANIZED HEALTH CARE EDUCATION/TRAINING PROGRAM

## 2023-10-25 PROCEDURE — 25000003 PHARM REV CODE 250: Performed by: STUDENT IN AN ORGANIZED HEALTH CARE EDUCATION/TRAINING PROGRAM

## 2023-10-25 PROCEDURE — 84100 ASSAY OF PHOSPHORUS: CPT | Performed by: STUDENT IN AN ORGANIZED HEALTH CARE EDUCATION/TRAINING PROGRAM

## 2023-10-25 PROCEDURE — 25000003 PHARM REV CODE 250: Performed by: INTERNAL MEDICINE

## 2023-10-25 PROCEDURE — 21400001 HC TELEMETRY ROOM

## 2023-10-25 PROCEDURE — 85025 COMPLETE CBC W/AUTO DIFF WBC: CPT | Performed by: STUDENT IN AN ORGANIZED HEALTH CARE EDUCATION/TRAINING PROGRAM

## 2023-10-25 PROCEDURE — 80048 BASIC METABOLIC PNL TOTAL CA: CPT | Performed by: STUDENT IN AN ORGANIZED HEALTH CARE EDUCATION/TRAINING PROGRAM

## 2023-10-25 RX ORDER — AMLODIPINE BESYLATE 5 MG/1
5 TABLET ORAL DAILY
Status: DISCONTINUED | OUTPATIENT
Start: 2023-10-25 | End: 2023-10-26

## 2023-10-25 RX ORDER — CARVEDILOL 3.12 MG/1
3.12 TABLET ORAL 2 TIMES DAILY WITH MEALS
Status: DISCONTINUED | OUTPATIENT
Start: 2023-10-25 | End: 2023-10-26 | Stop reason: HOSPADM

## 2023-10-25 RX ADMIN — VALSARTAN 80 MG: 80 TABLET, FILM COATED ORAL at 09:10

## 2023-10-25 RX ADMIN — TAMSULOSIN HYDROCHLORIDE 0.4 MG: 0.4 CAPSULE ORAL at 09:10

## 2023-10-25 RX ADMIN — ENOXAPARIN SODIUM 40 MG: 40 INJECTION SUBCUTANEOUS at 05:10

## 2023-10-25 RX ADMIN — INSULIN ASPART 4 UNITS: 100 INJECTION, SOLUTION INTRAVENOUS; SUBCUTANEOUS at 08:10

## 2023-10-25 RX ADMIN — CARVEDILOL 3.12 MG: 3.12 TABLET, FILM COATED ORAL at 05:10

## 2023-10-25 RX ADMIN — ASPIRIN 81 MG: 81 TABLET, COATED ORAL at 09:10

## 2023-10-25 RX ADMIN — VALSARTAN 80 MG: 80 TABLET, FILM COATED ORAL at 08:10

## 2023-10-25 RX ADMIN — ATORVASTATIN CALCIUM 40 MG: 40 TABLET, FILM COATED ORAL at 08:10

## 2023-10-25 RX ADMIN — CARVEDILOL 6.25 MG: 3.12 TABLET, FILM COATED ORAL at 09:10

## 2023-10-25 RX ADMIN — FUROSEMIDE 40 MG: 40 TABLET ORAL at 09:10

## 2023-10-25 RX ADMIN — PANTOPRAZOLE SODIUM 40 MG: 40 TABLET, DELAYED RELEASE ORAL at 08:10

## 2023-10-25 RX ADMIN — AMLODIPINE BESYLATE 5 MG: 5 TABLET ORAL at 05:10

## 2023-10-25 RX ADMIN — PANTOPRAZOLE SODIUM 40 MG: 40 TABLET, DELAYED RELEASE ORAL at 09:10

## 2023-10-25 NOTE — NURSING
Patient voiced concerns to me about wanting to be at [facility]. I reviewed the care plan with the patient and the patient and family were agreeable to see what labs and chest x-ray look like in the am and to start the PO lasix in the AM.

## 2023-10-25 NOTE — PROGRESS NOTES
"Inpatient Nutrition Evaluation    Admit Date: 10/22/2023   Total duration of encounter: 3 days    Nutrition Recommendation/Prescription     - continue cardiac diabetic diet    Nutrition Assessment     Chart Review    Reason Seen: continuous nutrition monitoring    Malnutrition Screening Tool Results                Diagnosis:  Acute decompensated heart failure   Bilateral pleural effusion/alveolar edema secondary to above  Acute hypoxemic respiratory failure secondary to above on 2 L nasal cannula  bladder cancer status post tumor resection/BCG treatment  Poorly-controlled HTN -improving  HLD  GERD    Relevant Medical History:  HTN, HLD, CAD status post remote PCI in 2004, gastric sleeve    Nutrition-Related Medications: furosemide, pantoprazole    Nutrition-Related Labs:  10/24: Cl 96, BUN 25.5, Glu 177    Diet Order: Diet Cardiac Diabetic; Standard Tray  Oral Supplement Order: none  Appetite/Oral Intake: fair/50-75% of meals  Factors Affecting Nutritional Intake: none identified  Food/Jainism/Cultural Preferences: none reported  Food Allergies: no known food allergies       Wound(s):       Comments    10/25: pt reports fair appetite, usually eats smaller more frequent meals/snacks    Anthropometrics    Height: 5' 6" (167.6 cm) Height Method: Stated  Last Weight: 76.2 kg (168 lb) (10/22/23 0347) Weight Method: Stated  BMI (Calculated): 27.1  BMI Classification: overweight (BMI 25-29.9)     Ideal Body Weight (IBW), Female: 130 lb     % Ideal Body Weight, Female (lb): 129.23 %                             Usual Weight Provided By: patient denies unintentional weight loss    Wt Readings from Last 5 Encounters:   10/22/23 76.2 kg (168 lb)   08/29/23 76.7 kg (169 lb)   07/07/23 76.2 kg (168 lb)   06/27/23 71.7 kg (158 lb)   05/26/23 78 kg (172 lb)     Weight Change(s) Since Admission:  Admit Weight: 76.2 kg (168 lb) (10/22/23 0347)      Patient Education    Not applicable.    Monitoring & Evaluation     Dietitian will " monitor food and beverage intake.  Nutrition Risk/Follow-Up: low (follow-up in 5-7 days)  Patients assigned 'low nutrition risk' status do not qualify for a full nutritional assessment but will be monitored and re-evaluated in a 5-7 day time period. Please consult if re-evaluation needed sooner.

## 2023-10-25 NOTE — PROGRESS NOTES
"[facility]  Hospital Medicine Progress Note      Chief Complaint: tired, weak, short of breath     HPI:   63-year-old  female with significant history of HTN, HLD, CAD status post remote PCI in 2004 currently only on aspirin.  Patient also was diagnosed with bladder cancer early stage in 2023 and underwent tumor resection/BCG treatment.  Patient follows up with [facility], she was recently in [facility] for cystoscopy on 10/19.  Patient was discharged home on 10/21, after reaching home patient felt significantly tired, weak and was short of breath . shortness of breaths progressively worsened which prompted the family to bring the patient to the ED. patient was significantly hypertensive in the ED. lab significant for mild leukocytosis.  EKG-NSR.  CT imaging concerning for pulmonary vascular congestion/alveolar edema.  No PE . patient received IV Lasix 20 mg in the ED.  Hospitalist team was consulted for admission.  No previous history of CHF .  patient is on close follow-up with a primary cardiologist Dr. Martinez.  Troponins negative.  No chest pain.  Initiated IV Lasix paid transthoracic echocardiogram ordered.  Supplemental oxygen to keep saturations above 92%.  No pneumonia, PE. leukocytosis likely stress induced.     Patient concerned about "darker urine" otherwise no complaints.    Interval history   Patient is currently concerned about her blood pressure, she states that she is had blood pressure problems for a while and has made known her concerns to her PCP who has not made adjustments to her blood pressure medications, according to her.    She states that when she checks her blood pressure at home her systolic is 160s, 170s, 180s with a diastolic between 70s and 80s.  Today her blood pressure is much better given her age.    Patient understands that plan for discharge in the " [facility].  __________________________________________________________________________________________________________________________________      Objective/physical exam:  Vital signs have been personally reviewed by me   Neuro: awake, alert, oriented    General: Appears comfortable, no acute distress.  Integumentary: Warm, dry, intact.  Musculoskeletal: Purposeful movement noted.   Respiratory: No accessory muscle use. Breath sounds are equal.  Cardiovascular: Regular rate. No peripheral edema.    VITAL SIGNS: 24 HRS MIN & MAX LAST   Temp  Min: 97.4 °F (36.3 °C)  Max: 98 °F (36.7 °C) 97.5 °F (36.4 °C)   BP  Min: 127/66  Max: 183/72 (!) 147/78   Pulse  Min: 54  Max: 68  (!) 55   Resp  Min: 18  Max: 18 18   SpO2  Min: 93 %  Max: 98 % 97 %     X-Ray Chest 1 View  Narrative: EXAMINATION:  XR CHEST 1 VIEW    CLINICAL HISTORY:  congestion;    TECHNIQUE:  AP chest    COMPARISON:  Chest x-ray dated 10/22/2023    FINDINGS:  The heart is normal in size.  The lungs are clear.  There is no pleural effusion or visible pneumothorax.  Impression: Improved aeration of the lungs.    Electronically signed by: Dari Reeves  Date:    10/25/2023  Time:    06:11    Recent Labs   Lab 10/22/23  0432 10/23/23  0433   WBC 13.91* 10.86   RBC 4.55 4.02*   HGB 14.4 12.7   HCT 42.1 36.4*   MCV 92.5 90.5   MCH 31.6* 31.6*   MCHC 34.2 34.9   RDW 12.6 12.0    159   MPV 12.0* 12.4*       Recent Labs   Lab 10/22/23  0432 10/23/23  0433 10/24/23  0342    140 140   K 4.2 4.1 4.0   CO2 30 31 36*   BUN 11.8 20.2* 25.5*   CREATININE 0.71 0.76 0.97   CALCIUM 9.7 9.2 8.9   MG 1.60  --   --    ALBUMIN 4.2 3.6  --    ALKPHOS 106 86  --    ALT 38 23  --    AST 41* 12  --    BILITOT 0.9 0.7  --        Microbiology Results (last 7 days)       ** No results found for the last 168 hours. **          See below for Radiology    Scheduled Med:   aspirin  81 mg Oral Daily    atorvastatin  40 mg Oral Daily    carvediloL  6.25 mg Oral BID WM    enoxparin   40 mg Subcutaneous Daily    furosemide  40 mg Oral Daily    pantoprazole  40 mg Oral BID    tamsulosin  0.4 mg Oral Daily    valsartan  80 mg Oral BID      PRN Meds:  acetaminophen, acetaminophen, cloNIDine, dextrose 10%, dextrose 10%, diazePAM, glucagon (human recombinant), glucose, glucose, insulin aspart U-100, ondansetron, phenazopyridine     __________________________________________________________________________________________________________________________________      Assessment/Plan:  Acute decompensated heart failure   Bilateral pleural effusion/alveolar edema secondary to above  Acute hypoxemic respiratory failure secondary to above on 2 L nasal cannula  bladder cancer status post tumor resection/BCG treatment  Poorly-controlled HTN -improving  HLD  History of CAD status post PCI in 2004  GERD  _______________________________________________________________________________________________________________________________  Wean supplemental oxygen as tolerated  Continue Lasix therapy.  Coreg was increased to 6.25--will decrease back to 3.125 mg b.i.d. and add amlodipine 5 mg for daily use.  Patient will need to follow up with her PCP for appropriate adjustments to blood pressure medications and maintain a blood pressure log at home   chest x-ray reviewed  Continue lasix transitioned to oral agent---40mg daily  Monitor renal function and volume status   Continue supportive care  Continue checking vital signs q4hrs.     Nurse notified to page me if any changes occur   DVT prophylaxis initiated     I have spent >30 minutes on the day of the visit; time spent includes face to face time and non-face to face time preparing to see the patient (eg, review of tests), independently reviewing and interpreting medical records, both past and current; documenting clinical information in the electronic or other health record, and communicating results to the patient/family/caregiver and care coordinator and nursing  team.      Anticipated discharge and Disposition when medically stable:  Pending     All diagnosis and differential diagnosis have been reviewed,  interpreted and communicated appropriately to care team. assessment and plan has been documented; I have personally reviewed the labs and test results that are presently available and pertinent to this hospital course; I have reviewed medical records based upon their availability.    All of the patient's questions have been  addressed and answered. Patient's is agreeable to the above stated plan.   I will continue to monitor closely and make adjustments to medical management as needed.    Rebecca Vizcaino, DO   10/25/2023     This note was created with the assistance of Dragon voice recognition software. There may be transcription errors as a result of using this technology however minimal. Effort has been made to assure accuracy of transcription but any obvious errors or omissions should be clarified with the author of the document.

## 2023-10-26 VITALS
SYSTOLIC BLOOD PRESSURE: 163 MMHG | WEIGHT: 168 LBS | DIASTOLIC BLOOD PRESSURE: 72 MMHG | HEART RATE: 56 BPM | HEIGHT: 66 IN | RESPIRATION RATE: 18 BRPM | BODY MASS INDEX: 27 KG/M2 | OXYGEN SATURATION: 98 % | TEMPERATURE: 98 F

## 2023-10-26 PROBLEM — I50.32 CHRONIC DIASTOLIC HEART FAILURE: Status: ACTIVE | Noted: 2023-10-26

## 2023-10-26 LAB
POCT GLUCOSE: 149 MG/DL (ref 70–110)
POCT GLUCOSE: 159 MG/DL (ref 70–110)
POCT GLUCOSE: 206 MG/DL (ref 70–110)

## 2023-10-26 PROCEDURE — 25000003 PHARM REV CODE 250: Performed by: INTERNAL MEDICINE

## 2023-10-26 PROCEDURE — 25000003 PHARM REV CODE 250: Performed by: STUDENT IN AN ORGANIZED HEALTH CARE EDUCATION/TRAINING PROGRAM

## 2023-10-26 RX ORDER — AMLODIPINE BESYLATE 10 MG/1
10 TABLET ORAL DAILY
Qty: 30 TABLET | Refills: 0 | Status: SHIPPED | OUTPATIENT
Start: 2023-10-26 | End: 2023-11-27 | Stop reason: SDUPTHER

## 2023-10-26 RX ORDER — VALSARTAN 80 MG/1
160 TABLET ORAL 2 TIMES DAILY
Status: DISCONTINUED | OUTPATIENT
Start: 2023-10-26 | End: 2023-10-26 | Stop reason: HOSPADM

## 2023-10-26 RX ORDER — AMLODIPINE BESYLATE 5 MG/1
10 TABLET ORAL DAILY
Status: DISCONTINUED | OUTPATIENT
Start: 2023-10-26 | End: 2023-10-26 | Stop reason: HOSPADM

## 2023-10-26 RX ORDER — AMLODIPINE BESYLATE 5 MG/1
10 TABLET ORAL DAILY
Status: ON HOLD | COMMUNITY
Start: 2023-10-18 | End: 2023-10-26 | Stop reason: SDUPTHER

## 2023-10-26 RX ORDER — VALSARTAN 320 MG/1
320 TABLET ORAL DAILY
Qty: 60 TABLET | Refills: 0 | Status: SHIPPED | OUTPATIENT
Start: 2023-10-26 | End: 2024-01-15 | Stop reason: SDUPTHER

## 2023-10-26 RX ORDER — FUROSEMIDE 20 MG/1
20 TABLET ORAL DAILY
Qty: 5 TABLET | Refills: 0 | Status: SHIPPED | OUTPATIENT
Start: 2023-10-27 | End: 2024-02-06

## 2023-10-26 RX ADMIN — PANTOPRAZOLE SODIUM 40 MG: 40 TABLET, DELAYED RELEASE ORAL at 09:10

## 2023-10-26 RX ADMIN — ASPIRIN 81 MG: 81 TABLET, COATED ORAL at 09:10

## 2023-10-26 RX ADMIN — AMLODIPINE BESYLATE 10 MG: 5 TABLET ORAL at 09:10

## 2023-10-26 RX ADMIN — FUROSEMIDE 40 MG: 40 TABLET ORAL at 09:10

## 2023-10-26 RX ADMIN — ATORVASTATIN CALCIUM 40 MG: 40 TABLET, FILM COATED ORAL at 09:10

## 2023-10-26 RX ADMIN — VALSARTAN 160 MG: 80 TABLET, FILM COATED ORAL at 09:10

## 2023-10-26 RX ADMIN — TAMSULOSIN HYDROCHLORIDE 0.4 MG: 0.4 CAPSULE ORAL at 09:10

## 2023-10-26 NOTE — DISCHARGE SUMMARY
"[facility] HOSPITAL MEDICINE - DISCHARGE SUMMARY    Patient Name: Rosario Regalado  MRN: [id number]  Admission Date: 10/22/2023  Discharge Date: 10/26/2023  Hospital Length of Stay: 4 days  Discharge Provider: Adam Hart MD  Primary Care Provider: MELLY Serrato Jr., MD      HOSPITAL COURSE   63-year-old  female with significant history of HTN, HLD, CAD status post remote PCI in 2004 currently only on aspirin.  Patient also was diagnosed with bladder cancer early stage in 2023 and underwent tumor resection/BCG treatment.  Patient follows up with MD Reyes, she was recently in [facility] for cystoscopy on 10/19.  Patient was discharged home on 10/21, after reaching home patient felt significantly tired, weak and was short of breath . shortness of breaths progressively worsened which prompted the family to bring the patient to the ED. patient was significantly hypertensive in the ED. lab significant for mild leukocytosis.  EKG-NSR.  CT imaging concerning for pulmonary vascular congestion/alveolar edema.  No PE . patient received IV Lasix 20 mg in the ED.  Hospitalist team was consulted for admission.  No previous history of CHF .  patient is on close follow-up with a primary cardiologist Dr. Martinez.  Troponins negative.  No chest pain.  Initiated IV Lasix paid transthoracic echocardiogram ordered.  Supplemental oxygen to keep saturations above 92%.  No pneumonia, PE. leukocytosis likely stress induced.    Patient concerned about "darker urine" otherwise no complaints.       Essentially patient was admitted for shortness of breath progressively getting worse since coming home from her surgery.  She did have CTP protocol done which was negative for pulmonary embolism and did note some alveolar edema.  She had received fluids during her admission to the hospital and likely contributed to her fluid overload.  Echocardiogram did reveal diastolic dysfunction likely from " her longstanding uncontrolled hypertension severely elevated.  At this time we have made some adjustments and increased her valsartan to 320 mg daily, added amlodipine 10 mg.  She is doing well on room air this morning no further issues and no shortness a breath.  Discussed weight watching, low-salt intake.  She has a follow-up with her cardiologist already planned for November 8th.  She can keep this appointment.  Plan for discharge home today.        PHYSICAL EXAM     Most Recent Vital Signs:  Temp: 97.9 °F (36.6 °C) (10/26/23 1106)  Pulse: (!) 56 (10/26/23 1106)  Resp: 18 (10/25/23 1544)  BP: (!) 163/72 (10/26/23 1106)  SpO2: 98 % (10/26/23 1106)   GENERAL: In no acute distress, afebrile  HEENT:  CHEST: Clear to auscultation bilaterally  HEART: S1, S2, no appreciable murmur  ABDOMEN: Soft, nontender, BS +  MSK: Warm, no lower extremity edema, no clubbing or cyanosis  NEUROLOGIC: Alert and oriented x4, moving all extremities with good strength   INTEGUMENTARY:  PSYCHIATRY:          DISCHARGE DIAGNOSIS   Acute on chronic diastolic heart failure-decompensated   Bilateral interstitial edema   Acute hypoxemic respiratory failure-resolved   Bladder cancer status post tumor resection BCG treatment recently   Hypertensive emergency with pulmonary edema   HLD  CAD PCI 2004   GERD        _____________________________________________________________________________      DISCHARGE MED REC     Current Discharge Medication List        START taking these medications    Details   furosemide (LASIX) 20 MG tablet Take 1 tablet (20 mg total) by mouth once daily. for 5 days  Qty: 5 tablet, Refills: 0           CONTINUE these medications which have CHANGED    Details   valsartan (DIOVAN) 320 MG tablet Take 1 tablet (320 mg total) by mouth once daily.  Qty: 60 tablet, Refills: 0    Comments: .           CONTINUE these medications which have NOT CHANGED    Details   aspirin (ECOTRIN) 81 MG EC tablet Take 81 mg by mouth.      tamsulosin  (FLOMAX) 0.4 mg Cap TAKE 1 CAPSULE BY MOUTH EVERY 12 HOURS AS NEEDED FOR RENAL COLIC  Qty: 180 capsule, Refills: 1    Associated Diagnoses: Renal colic on right side      amLODIPine (NORVASC) 5 MG tablet Take 10 mg by mouth once daily.      carvediloL (COREG) 3.125 MG tablet Take 1 tablet (3.125 mg total) by mouth 2 (two) times daily with meals.  Qty: 180 tablet, Refills: 3    Comments: .      diazePAM (VALIUM) 5 MG tablet Take 1 to 2 Tab(s) Oral up to every 8 hours as needed for Acute Anxiety  Qty: 60 tablet, Refills: 0      hyoscyamine (LEVSIN) 0.125 mg TbDL Take 0.125 mg by mouth.      pantoprazole (PROTONIX) 40 MG tablet TAKE 1 TABLET BY MOUTH TWICE A DAY  Qty: 180 tablet, Refills: 1    Associated Diagnoses: Gastroesophageal reflux disease, unspecified whether esophagitis present      rosuvastatin (CRESTOR) 40 MG Tab Take 1 tablet (40 mg total) by mouth once daily.  Qty: 90 tablet, Refills: 2      RYBELSUS 3 mg tablet TAKE 1 TABLET BY MOUTH 30 MINS BEFORE FIRST FOOD, BEVERAGE OR OTHER MEALS Strength: 3 mg  Qty: 90 tablet, Refills: 3                CONSULTS         FOLLOW UP      Follow-up Information       MELLY Serrato Jr., MD Follow up in 1 week(s).    Specialty: Family Medicine  Contact information:  [address] [place] [id number]  [phone]               Dr Nelson Follow up.    Why: Keep appointment with cardiologist as scheduled already                               DISCHARGE INSTRUCTIONS     Explained in detail to the patient about the discharge plan, medications, and follow-up visits. Pt understands and agrees with the treatment plan.  Discharged Condition: stable  Diet as tolerated  Activities as tolerated  Discharge to: Home or Self Care    TIME SPENT ON DISCHARGE   35 minutes        Adam Hart MD  Internal Medicine  Department of Hospital Medicine [facility]      This document was created using electronic dictation services.  Please excuse  any errors that may have been made.  Contact me if any questions regarding documentation to clarify verbiage.

## 2023-10-26 NOTE — PLAN OF CARE
Problem: Infection  Goal: Absence of Infection Signs and Symptoms  Outcome: Met     Problem: Adult Inpatient Plan of Care  Goal: Plan of Care Review  Outcome: Met  Goal: Patient-Specific Goal (Individualized)  Outcome: Met  Goal: Absence of Hospital-Acquired Illness or Injury  Outcome: Met  Goal: Optimal Comfort and Wellbeing  Outcome: Met  Goal: Readiness for Transition of Care  Outcome: Met     Problem: Diabetes Comorbidity  Goal: Blood Glucose Level Within Targeted Range  Outcome: Met     Problem: Fall Injury Risk  Goal: Absence of Fall and Fall-Related Injury  Outcome: Met

## 2023-10-27 ENCOUNTER — PATIENT OUTREACH (OUTPATIENT)
Dept: ADMINISTRATIVE | Facility: CLINIC | Age: 63
End: 2023-10-27
Payer: COMMERCIAL

## 2023-10-27 ENCOUNTER — PATIENT MESSAGE (OUTPATIENT)
Dept: ADMINISTRATIVE | Facility: CLINIC | Age: 63
End: 2023-10-27
Payer: COMMERCIAL

## 2023-10-27 NOTE — PROGRESS NOTES
C3 nurse attempted to contact Rosario Regalado for a TCC post hospital discharge follow up call. No answer. Left voicemail with callback information. The patient has a scheduled HOSFU appointment with Dr. Del Castillo (cardio) on 11/8/23, and C3 nurse cannot locate appointment with Jose Serrato Jr, MD (PCP) and cannot route to this provider.

## 2023-10-28 ENCOUNTER — PATIENT MESSAGE (OUTPATIENT)
Dept: ADMINISTRATIVE | Facility: OTHER | Age: 63
End: 2023-10-28
Payer: COMMERCIAL

## 2023-10-30 ENCOUNTER — PATIENT MESSAGE (OUTPATIENT)
Dept: ADMINISTRATIVE | Facility: CLINIC | Age: 63
End: 2023-10-30
Payer: COMMERCIAL

## 2023-10-30 NOTE — PROGRESS NOTES
C3 nurse spoke with Rosario Regalado for a TCC post hospital discharge follow up call. The patient does not have a scheduled HOSFU appointment with Jose Serrato Jr, MD (PCP) within 5-7 days post hospital discharge date 10/26/23. C3 nurse was unable to schedule HOSFU appointment in [facility].    Message sent to PCP staff requesting they contact patient and schedule follow up appointment.    Patient is scheduled for follow up with Dr. Del Castillo (cardiology) on 11/8/23, and will discuss new added BP medication at this time. C3 nurse advised to bring all medication bottles and DC paperwork to follow up appointments to assure medications are monitored closely, and she voiced understanding.     I also asked her about the questionnaire submission of missing medications, and she noted that it was only because she had to pick them up from the pharmacy and has not missed any doses since initiating.

## 2023-11-02 PROCEDURE — 83880 ASSAY OF NATRIURETIC PEPTIDE: CPT | Performed by: FAMILY MEDICINE

## 2023-11-16 NOTE — PHYSICIAN QUERY
PT Name: Rosario Regalado  MR #: [id number]     DOCUMENTATION CLARIFICATION     CDS/: Mary Jo Lowery RN CDIS             Contact information: [email or web address]    This form is a permanent document in the medical record.     Query Date: November 16, 2023    By submitting this query, we are merely seeking further clarification of documentation to reflect the severity of illness of your patient. Please utilize your independent clinical judgment when addressing the question(s) below.    The Medical Record contains the following:   Indicators   Supporting Clinical Findings Location in Medical Record   x Hypertension or hypertensive documented Hypertensive emergency with pulmonary edema     Hypertensive urgency  Discharge summary     H&P     Acute/Chronic condition(s)     x Vital signs VITAL SIGNS: 24 HRS MIN & MAX   BP  Min: 127/66  Max: 183/72     VITAL SIGNS: 24 HRS MIN & MAX   BP  Min: 147/79  Max: 181/78     VITAL SIGNS: 24 HRS MIN & MAX   BP  Min: 156/60  Max: 210/83  HM note 10/25      HM note 10/24       H&P     Lab findings      Radiology findings     x Treatment/Medication amLODIPine tablet 10 mg  Dose: 10 mg  Freq: Daily Route: Oral  Start: 10/26/23 0900 End: 10/26/23 1603     amLODIPine tablet 5 mg  Dose: 5 mg  Freq: Daily Route: Oral  Start: 10/25/23 1715 End: 10/26/23 0711    carvediloL tablet 3.125 mg  Dose: 3.125 mg  Freq: 2 times daily with meals Route: Oral  Start: 10/25/23 1715 End: 10/26/23 1603     carvediloL tablet 6.25 mg  Dose: 6.25 mg  Freq: 2 times daily with meals Route: Oral  Start: 10/23/23 0745 End: 10/25/23 1538     cloNIDine tablet 0.1 mg  Dose: 0.1 mg  Freq: ED 1 Time Route: Oral  Start: 10/22/23 0530 End: 10/22/23 0954     valsartan tablet 80 mg  Dose: 80 mg  Freq: 2 times daily Route: Oral  Start: 10/23/23 2100 End: 10/26/23 0711    MAR           MAR           MAR           MAR           MAR           MAR     Other       The clinical guidelines noted are only a system guideline. It  does not replace the provider's clinical judgment.  Provider, please clarify the Hypertensive condition   [   x] Hypertensive emergency - Severe hypertension (SBP>180 and/or DBP>120mmHg) with signs or symptoms of new or worsening end-organ damage (i.e. hypertensive encephalopathy, retinal hemorrhage, papilledema, acute kidney injury, stroke, heart attack, heart failure, kidney failure)   [   ] Hypertensive urgency - Severe asymptomatic hypertension (SBP>180 and/or DBP>120mmHg) without signs or symptoms of acute end-organ damage. Can have a mild headache.   [   ] Other cardiovascular condition (please specify): __________           Please document in your progress notes daily for the duration of treatment until resolved, and include in your discharge summary.    References:    SUNNY Henderson MD, PhD, & REY Hutton MD, FACP, FCCP, FCCM, FRSM. (2020, June 25). Evaluation and treatment of hypertensive emergencies in adults (ARTEM Fraga MD, SUNNY Jensen MD, & REY Gutierrez MD, MSc, Eds.). Retrieved October 22, 2020, from https://www.[employer]/contents/evaluation-and-treatment-mr-vtztudhvssfj-pmsgryvhraw-in-adults?search=hypertensive%20emergency&source=search_result&selectedTitle=1~150&usage_type=default&display_rank=1    REY Hutton MD, FACP, FCCP, FCCM, FR, & SUNNY Henderson MD, PhD. (2020, October 14). Management of severe asymptomatic hypertension (hypertensive urgencies) in adults (ARTEM Fraga MD, SUNNY Jensen MD, & REY Gutierrez MD, MSc, Eds.). Retrieved October 22, 2020, from https://www.[employer]/contents/management-of-severe-asymptomatic-hypertension-hypertensive-urgencies-in-adults?search=hypertensive%20urgency&source=search_result&selectedTitle=1~41&usage_type=default&display_rank=1    Form No. 81353

## 2023-11-16 NOTE — PHYSICIAN QUERY
PT Name: Rosario Regalado  MR #: [id number]     DOCUMENTATION CLARIFICATION     CDS/: Mary Jo Lowery RN CDIS           Contact information:[email or web address]    This form is a permanent document in the medical record.     Query Date: November 16, 2023    By submitting this query, we are merely seeking further clarification of documentation.  Please utilize your independent clinical judgment when addressing the question(s) below.  The Medical Record contains the following   Indicators   Supporting Clinical Findings Location in Medical Record   x Documentation of Respiratory Failure, ARDS Cardiomegaly with pleural effusions and findings of interstitial and early alveolar edema   Elevated BNP   Acute hypoxemic respiratory failure, likely secondary to above H&P     Subjective Respiratory Signs/Symptoms     x Objective Respiratory Signs/Symptoms Lungs: Clear to auscultation bilaterally.  H&P        x RR     O2 sat     O2 use SpO2 95% on room air     VITAL SIGNS: 24 HRS MIN & MAX   Resp  Min: 17  Max: 19   SpO2  Min: 95 %  Max: 97 %   Continue supplemental oxygen, wean as tolerated     VITAL SIGNS: 24 HRS MIN & MAX   Resp  Min: 16  Max: 20   SpO2  Min: 94 %  Max: 97 %     VITAL SIGNS: 24 HRS MIN & MAX   Resp  Min: 18  Max: 18   SpO2  Min: 93 %  Max: 98 %  H&P     H&P           HM note 10/24         HM note 10/25     Blood Gas (ABG or VBG)      Hypoxia/Hypercapnia      BiPAP/Intubation/Mechanical Ventilation      Home O2, Oxygen Dependence     x Acute/Chronic Illness Acute on chronic diastolic heart failure-decompensated   Hypertensive emergency with pulmonary edema  Discharge summary     Treatment     x Other   Chest x-ray revealed enlarged cardiac silhouette with interstitial edema  CXR H&P        The clinical guidelines noted are only a system guideline. It does not replace the providers clinical judgment.    [facility] Approved Diagnostic Criteria      Acute Respiratory Failure    Hypoxic: ABG pO2<60 mmHg or  O2 sat of <91% on RA   AND/OR   Hypercapnic: ABG pCO2>50 mmHg with pH <7.35   AND   Respiratory symptoms documented (Subjective: SOB; Objective: Tachypnea, respiratory distress, increased work of breathing, unable to speak in complete sentences, labored breathing, use of accessory muscles, RR>26, cyanosis, dyspnea, wheezing, stridor, lethargy)      Chronic Respiratory Failure   Hypoxic: Continuous home oxygen    AND/OR   Hypercapnic: Normal pH with high CO2 (ex. COPD)      Acute on Chronic Respiratory Failure   Hypoxic: ABG pO2>10mmHg below baseline OR ABG pO2<60 mmHg OR SpO2<91% on usual home O2  OR O2>2L/min over baseline home O2   AND/OR   Hypercapnic: ABG pCO2>50 mmHg OR pCO2>10mmHg over baseline and pH <7.35   AND   Respiratory symptoms documented      Acute Respiratory Distress Syndrome (ARDS) - an acute, diffuse, inflammatory form of lung injury. Suspect with progressive dyspnea, hypoxemic respiratory failure, and bilateral alveolar infiltrates on chest imaging within 6 to 72 hours of an inciting event.   Acute Respiratory Distress - Generally describes less severe respiratory symptoms (tachypnea, in respiratory distress, increased work of breathing, unable to speak in complete sentences, labored breathing, use of accessory muscles, RR> 24, cyanosis, dyspnea, wheezing, stridor, lethargy) without sufficient measurements (pO2, SpO2, pH, and pCO2) to meet criteria for respiratory failure)   Acute Respiratory Insufficiency - Generally describes less severe respiratory symptoms and measurements (pO2, SpO2, pH, and pCO2) not meeting criteria for respiratory failure         Due to the conflicting clinical picture, please clinically validate the diagnosis of _Acute hypoxemic respiratory failure_.    If validated, please provide additional clinical support for the diagnosis.     [x    ] Acute hypoxemic respiratory failure is not confirmed and/or it has been ruled out   [    ] Acute hypoxemic respiratory  failure is not confirmed and/or it has been ruled out, other diagnosis ruled in (please specify):  [    ] Acute Respiratory Distress   [    ] Acute Respiratory Insufficiency   [    ] Other Respiratory Diagnosis (please specify): _________________   _______________   [    ] Acute Respiratory Failure with Hypoxia diagnosis is confirmed and additional clinical support/decision-making indicators for the diagnosis include (please specify):_______________________________________________   [    ] Other clarification (please specify): ___________________         Please document in your progress notes daily for the duration of treatment until resolved and include in your discharge summary.     Reference:    SUSANA Martinez MD. (2020, March 13). Acute respiratory distress syndrome: Clinical features, diagnosis, and complications in adults ([id number] ALEXYS Mejia MD & [id number] ARTEM Nunez MD, Eds.). Retrieved November 13, 2020, from https://www.[employer]/contents/acute-respiratory-distress-syndrome-clinical-features-diagnosis-and-complications-in-adults?search=ards&source=search_result&selectedTitle=1~150&usage_type=default&display_rank=1  Form No. [id number]

## 2024-06-12 ENCOUNTER — TELEPHONE (OUTPATIENT)
Dept: SURGERY | Facility: CLINIC | Age: 64
End: 2024-06-12
Payer: COMMERCIAL

## 2024-06-26 ENCOUNTER — TELEPHONE (OUTPATIENT)
Dept: PHARMACY | Facility: CLINIC | Age: 64
End: 2024-06-26
Payer: COMMERCIAL

## 2024-08-30 NOTE — PROGRESS NOTES
Subjective:       Patient ID: Rosario Regalado is a 64 y.o. female.    Chief Complaint:  I was told that my blood count was abnormal    Diagnosis:  Multifocal superficial bladder tumors                     Squamous cell carcinoma left cheek                     History of bleeding post surgical procedures    Current Treatment:  Lost to follow-up (Last office visit 3/6/23)    Clinical History:  Patient required transfusion with 2 units PRBCs in 2002 following a routine vaginal hysterectomy. She underwent a laparoscopic gastric sleeve procedure 7/19 and also had postoperative bleeding requiring transfusion again with 2 units PRBCs. She takes Plavix 75 mg daily due to history of CAD with a single stent. Her Plavix had been held for 10 days prior to her gastric sleeve. She does report a history of easy bruising. She had heavy menses prior to her hysterectomy. Her Cardiologist asked her if she had ever been tested for von Willebrand's disease.    She was seen for a Hematology evaluation 2/18/20. CBC from 1/7/20 showed a WBC count of 8.3, hemoglobin 13.4, hematocrit 42.0 and platelets 221. Serum iron was 79, TIBC 347, saturation 22.8% and B12 level 846. She believes a maternal aunt had a history of bleeding after surgery. There is no known family history of bleeding disorders. She had one son without any history of abnormal bruising or bleeding.      PT and PTT were normal.  Platelet function showed prolongation with collagen/ADP consistent with antiplatelet therapy (Plavix).  Von Willebrand's panel showed normal von Willebrand's factor antigen and activity with normal multimers.  Factor VIII level was above normal.  Recommendations were to repeat von Willebrand's panel in 3-4 months since levels can fluctuate over time.  Repeat von Willebrand panel 6/4/20 was normal showing no evidence of factor VIII or von Willebrand's factor deficiency.    She was re-evaluated 3/23 for surgical clearance prior to cystoscopy and TURBT  secondary to bladder tumors. CT A/P 2/23/23 showed multiple masses in the bladder, largest on the right side measured 2.3 x 2.5 cm.  There was no abnormal lymphadenopathy.  Cystoscopy showed multiple tumors, no biopsies were performed.  PT and PTT 2/28/23 were normal.   23 was normal with a platelet count of 238.  Platelet function assay showed normal aggregation with ADP and epinephrine.  She was cleared for surgery with preoperative administration of IV DDAVP.  She could not establish urology follow-up locally and ended up presenting to [facility].  She underwent TURBT 5/11/23 with removal of multiple bladder tumors.  Pathology showed low-grade, noninvasive urothelial carcinoma with focal areas of high-grade differentiation.  She underwent an additional resection 10/19/23 with resection of a right periureteral tumor.  Pathology showed urothelial mucosa with squamous metaplasia and chronic inflammation but no invasive tumor.  She had no significant bleeding with either procedure.  Adjuvant treatment was recommended with BCG for 3 years.    She noted a suspicious lesion on her left cheek which was biopsied in [place] showing a squamous cell carcinoma.  She was referred to [place] for Mohs surgery but returned to [facility] for evaluation.  A preop CBC 8 624 showed a white count of 9.4 with a differential of 85% neutrophils, 13% lymphocytes and 2% monocytes.  Hemoglobin was 14.5, hematocrit 41.2 and platelets 175.  She is returning to [place] today for planned Mohs surgery tomorrow.      Interval History  She presents today because she had questions about her recent blood testing at [facility].  She said when she tried to ask questions, she was told to follow-up with her hematologist.  She denies any recent illnesses or infections.  No fever, chills, night sweats or weight loss.  She has no abdominal symptoms or complaints.  No significant dysuria, voiding difficulty or hematuria.  Her last cystoscopy  "and BCG treatment was 8/24.  Her next BCG treatment is scheduled in November.      Review of Systems   Constitutional:  Negative for appetite change, fatigue, fever and unexpected weight change.   HENT:  Negative for mouth sores, nosebleeds, sore throat and trouble swallowing.    Eyes: Negative.    Respiratory:  Negative for cough and shortness of breath.    Cardiovascular:  Negative for chest pain, palpitations and leg swelling.   Gastrointestinal:  Negative for abdominal distention, abdominal pain, constipation, diarrhea and nausea.   Genitourinary:  Negative for dysuria, flank pain, frequency and hematuria.   Musculoskeletal:  Negative for arthralgias and back pain.   Integumentary:  Negative for pallor and rash.   Neurological:  Negative for dizziness, weakness, numbness and headaches.   Hematological:  Negative for adenopathy. Does not bruise/bleed easily.   Psychiatric/Behavioral: Negative.         PMHx:  HTN, hyperlipidemia, CAD with stent, type 2 DM, anemia, MEIR, obesity, anxiety, PVD  PSHx:  Coronary stent, hysterectomy, tubal ligation, gastric sleeve  SH:  Former smoker <1/4 PPD, quit 6/14. Occasional social alcohol use. Lives alone in [place]. 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  FH:  A maternal aunt had postsurgical bleeding. A maternal aunt had brain cancer, maternal uncle had throat cancer.     Objective:        /69   Pulse 74   Temp 98.2 °F (36.8 °C)   Resp 15   Ht 5' 6" (1.676 m)   Wt 78.5 kg (173 lb)   SpO2 98%   BMI 27.92 kg/m²    Physical Exam  Constitutional:       Comments: Well-developed white female in NAD   HENT:      Head: Normocephalic.      Mouth/Throat:      Mouth: Mucous membranes are moist.      Pharynx: Oropharynx is clear. No posterior oropharyngeal erythema.   Eyes:      Extraocular Movements: Extraocular movements intact.      Conjunctiva/sclera: Conjunctivae normal.      Pupils: Pupils are equal, round, and reactive to light.   Cardiovascular:      Rate and " Rhythm: Normal rate and regular rhythm.      Heart sounds: No murmur heard.  Pulmonary:      Comments: Lungs clear to auscultation  Abdominal:      General: Bowel sounds are normal. There is no distension.      Palpations: Abdomen is soft. There is no mass.      Tenderness: There is no abdominal tenderness.   Musculoskeletal:         General: No swelling or tenderness. Normal range of motion.      Cervical back: Neck supple. No tenderness.   Skin:     General: Skin is warm and dry.      Findings: No rash.   Neurological:      General: No focal deficit present.      Mental Status: She is oriented to person, place, and time.      Cranial Nerves: No cranial nerve deficit.      Motor: No weakness.        LABORATORY  No results found for this or any previous visit (from the past 168 hour(s)).      Latest Reference Range & Units 08/14/24 09:51   WBC 4.50 - 11.50 x10(3)/mcL 8.10   RBC 4.20 - 5.40 x10(6)/mcL 4.47   Hemoglobin 12.0 - 16.0 g/dL 14.2   Hematocrit 37.0 - 47.0 % 40.5   MCV 80.0 - 94.0 fL 90.6   MCH 27.0 - 31.0 pg 31.8 (H)   MCHC 33.0 - 36.0 g/dL 35.1   RDW 11.5 - 17.0 % 11.8   Platelet Count 130 - 400 x10(3)/mcL 176   MPV 7.4 - 10.4 fL 10.9 (H)   Neut % % 65.7   LYMPH % % 24.5   Mono % % 9.8   Neut # 2.1 - 9.2 x10(3)/mcL 5.3   Lymph # 0.6 - 4.6 x10(3)/mcL 2.0   Mono # 0.1 - 1.3 x10(3)/mcL 0.8         Assessment:   Multifocal superficial bladder tumors  Squamous cell carcinoma left cheek  History of bleeding post surgical procedures      Plan:   Extensive outside treatment, surgical records and pathology from [facility] were reviewed in preparation for her office visit today.  Patient was reassured that her CBC is normal and shows no significant abnormalities that warrant additional investigation.  She is cleared from a hematology standpoint to proceed with her scheduled Mohs resection of the left cheek squamous cell carcinoma.  She will continue surveillance and adjuvant BCG therapy for her superficial bladder  tumors at [facility].  Follow-up in my office will be on an as-needed basis.      ELEONORA TRINIDAD MD    Other Physicians  Dr. Jose Del Castillo

## 2024-09-10 ENCOUNTER — OFFICE VISIT (OUTPATIENT)
Dept: HEMATOLOGY/ONCOLOGY | Facility: CLINIC | Age: 64
End: 2024-09-10
Payer: COMMERCIAL

## 2024-09-10 VITALS
SYSTOLIC BLOOD PRESSURE: 134 MMHG | BODY MASS INDEX: 27.8 KG/M2 | DIASTOLIC BLOOD PRESSURE: 69 MMHG | HEIGHT: 66 IN | OXYGEN SATURATION: 98 % | HEART RATE: 74 BPM | TEMPERATURE: 98 F | WEIGHT: 173 LBS | RESPIRATION RATE: 15 BRPM

## 2024-09-10 DIAGNOSIS — C67.8 MALIGNANT NEOPLASM OF OVERLAPPING SITES OF BLADDER: Primary | ICD-10-CM

## 2024-09-10 PROCEDURE — 99214 OFFICE O/P EST MOD 30 MIN: CPT | Mod: S$GLB,,, | Performed by: INTERNAL MEDICINE

## 2024-09-10 PROCEDURE — 3052F HG A1C>EQUAL 8.0%<EQUAL 9.0%: CPT | Mod: CPTII,S$GLB,, | Performed by: INTERNAL MEDICINE

## 2024-09-10 PROCEDURE — 99999 PR PBB SHADOW E&M-EST. PATIENT-LVL V: CPT | Mod: PBBFAC,,, | Performed by: INTERNAL MEDICINE

## 2024-09-10 PROCEDURE — 4010F ACE/ARB THERAPY RXD/TAKEN: CPT | Mod: CPTII,S$GLB,, | Performed by: INTERNAL MEDICINE

## 2024-09-10 PROCEDURE — 1159F MED LIST DOCD IN RCRD: CPT | Mod: CPTII,S$GLB,, | Performed by: INTERNAL MEDICINE

## 2024-09-10 PROCEDURE — 3078F DIAST BP <80 MM HG: CPT | Mod: CPTII,S$GLB,, | Performed by: INTERNAL MEDICINE

## 2024-09-10 PROCEDURE — 3008F BODY MASS INDEX DOCD: CPT | Mod: CPTII,S$GLB,, | Performed by: INTERNAL MEDICINE

## 2024-09-10 PROCEDURE — 1160F RVW MEDS BY RX/DR IN RCRD: CPT | Mod: CPTII,S$GLB,, | Performed by: INTERNAL MEDICINE

## 2024-09-10 PROCEDURE — 3075F SYST BP GE 130 - 139MM HG: CPT | Mod: CPTII,S$GLB,, | Performed by: INTERNAL MEDICINE

## 2024-09-10 RX ORDER — HYDROCORTISONE 25 MG/G
CREAM TOPICAL
COMMUNITY
Start: 2024-07-22

## 2024-09-10 RX ORDER — KETOCONAZOLE 20 MG/G
CREAM TOPICAL
COMMUNITY
Start: 2024-07-22

## 2024-09-10 RX ORDER — KETOCONAZOLE 20 MG/ML
SHAMPOO, SUSPENSION TOPICAL
COMMUNITY
Start: 2024-09-06

## 2025-06-27 ENCOUNTER — PATIENT MESSAGE (OUTPATIENT)
Dept: SURGERY | Facility: CLINIC | Age: 65
End: 2025-06-27
Payer: COMMERCIAL